# Patient Record
Sex: MALE | Race: WHITE | NOT HISPANIC OR LATINO | Employment: OTHER | URBAN - METROPOLITAN AREA
[De-identification: names, ages, dates, MRNs, and addresses within clinical notes are randomized per-mention and may not be internally consistent; named-entity substitution may affect disease eponyms.]

---

## 2017-01-13 ENCOUNTER — MYC MEDICAL ADVICE (OUTPATIENT)
Dept: ORTHOPEDICS | Facility: CLINIC | Age: 37
End: 2017-01-13

## 2017-01-18 ENCOUNTER — OFFICE VISIT (OUTPATIENT)
Dept: ORTHOPEDICS | Facility: CLINIC | Age: 37
End: 2017-01-18
Payer: COMMERCIAL

## 2017-01-18 VITALS — BODY MASS INDEX: 29.98 KG/M2 | RESPIRATION RATE: 16 BRPM | WEIGHT: 191 LBS | HEIGHT: 67 IN

## 2017-01-18 DIAGNOSIS — M77.12 LATERAL EPICONDYLITIS OF LEFT ELBOW: Primary | ICD-10-CM

## 2017-01-18 PROCEDURE — 20550 NJX 1 TENDON SHEATH/LIGAMENT: CPT | Mod: LT | Performed by: ORTHOPAEDIC SURGERY

## 2017-01-18 RX ORDER — TRIAMCINOLONE ACETONIDE 40 MG/ML
40 INJECTION, SUSPENSION INTRA-ARTICULAR; INTRAMUSCULAR ONCE
Qty: 1 ML | Refills: 0 | OUTPATIENT
Start: 2017-01-18 | End: 2017-01-18

## 2017-01-18 ASSESSMENT — PAIN SCALES - GENERAL: PAINLEVEL: SEVERE PAIN (7)

## 2017-01-18 NOTE — PROGRESS NOTES
Chief Complaint:   Chief Complaint   Patient presents with     RECHECK     left elbow lateral epicondylitis. last injection on 11/4/16. Would like a cortisone injection today prior to going out west on a snowmobiling trip.         HPI: Chintan Niño is a 35 year old male , right -hand dominant, who presents for followup evaluation and management of left elbow lateral epicondylitis, without specific injury. Pain has been present since 11/2014, more than 2 years. Pain is aggravated by rotating forearm. Attributes to work and golfing, playing hockey. His last cortisone injection was on 11/4/2016, 2 months ago, which provided good relief. The pain started to return recently, rated a 7/10. He has also done physical therapy and has been doing the exercises. He is here today for a repeat injection. He is going out west for a snowmobiling trip. He's not been wearing his tennis elbow strap.      He reports having moderate pain/discomfort at the elbow. He no reports numbness or tingling currently.   Pain severity: 7/10   Pain quality: shooting  Frequency of symptoms: are constant  Associated symptoms: radiating pain up the arm, numbness and tingling in the whole hand at times.  Aggravated by: lifting, golf, hockey  Relieved by: cortisone injection     Treatment up to this point: injection, Physical Therapy, tennis elbow band, wrist brace.    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   Patient Active Problem List    Diagnosis Date Noted     Lateral epicondylitis, left elbow 02/17/2016     Priority: Medium     Attention deficit disorder 07/15/2013     Priority: Medium     Problem list name updated by  automated process. Provider to review       Adjustment disorder with mixed anxiety and depressed mood 07/15/2013     Priority: Medium     Contusion of knee - Right 05/08/2012     Priority: Medium     Contusion of elbow - Right 05/08/2012     Priority: Medium     GERD (gastroesophageal reflux disease) 01/20/2011     Priority: Medium     CARDIOVASCULAR SCREENING; LDL GOAL LESS THAN 160 01/20/2011     Priority: Medium         Past surgical history:  has past surgical history that includes Appendectomy open child.     Medications:    Current Outpatient Prescriptions   Medication Sig Dispense Refill     esomeprazole (NEXIUM) 20 MG capsule Take 1 capsule (20 mg) by mouth every morning (before breakfast) Take 30-60 minutes before eating. 90 capsule 1     ranitidine (ZANTAC) 300 MG tablet Take 1 tablet (300 mg) by mouth daily 15 tablet 1     ranitidine (ZANTAC) 300 MG tablet Take 1 tablet (300 mg) by mouth daily 90 tablet 0     sucralfate (CARAFATE) 1 GM tablet Take 1 tablet (1 g) by mouth 4 times daily 40 tablet 1     butalbital-acetaminophen-caffeine (FIORICET, ESGIC) -40 MG per tablet Take 1 tablet by mouth every 4 hours as needed 28 tablet 1        Allergies:   No Known Allergies     Family History: family history includes CANCER in his father; CEREBROVASCULAR DISEASE in his maternal grandmother; DIABETES in his mother. There is no history of Asthma, C.A.D., Hypertension, Breast Cancer, Cancer - colorectal, or Prostate Cancer.     Social History: .  reports that he has quit smoking. He has never used smokeless tobacco. He reports that he does not drink alcohol or use illicit drugs.    Review of Systems:     Denies numbness, tingling, parasthesias.   Denies headaches.   Denies shortness of breath.   Denies any skin problems, abrasions, rashes, irritation.      Physical Exam  GENERAL APPEARANCE: healthy, alert, no distress.   SKIN: no suspicious lesions or rashes  RESPIRATORY: No increased work of  "breathing.  NEURO: Normal strength and tone, mentation intact and speech normal  PSYCH:  mentation appears normal and affect normal/bright. Not anxious.    Resp 16  Ht 1.702 m (5' 7\")  Wt 86.637 kg (191 lb)  BMI 29.91 kg/m2       left UPPER EXTREMITY:    Sensation intact to light touch in median, radial, ulnar and axillary nerve distributions  Palpable 2+ radial pulse, brisk capillary refill to all fingers, wwp  Intact epl fpl fdp edc wrist flexion/extension biceps triceps deltoid    ELBOW:   Skin intact. No open wounds. No skin maceration.  Inspection: no swelling, no ecchymosis, no olecranon bursa swelling, no distal bicep tendon defect  Tender: lateral epicondyle, common extensor tendon and extensor muscles of forearm  Non-tender: medial epicondyle, common flexor tendon, flexor muscle of forearm, supracondylar notch, olecranon bursa, distal bicep tendon and radial head/neck  Range of Motion: flexion: 145 degrees, extension: full, supination:  80 degrees, pronation: 80 degrees  Strength: elbow strength full  Special tests: normal stability, pain with resisted wrist extension, pain with resisted middle finger extension, no pain with resisted wrist flexion  There is no deformity in the area.      X-rays: no new x-rays  3 views left elbow from 12/22/2014:  No obvious fractures or dislocations. Possible small osteophyte lateral aspect radial head. No effusion      MRI left elbow 3/9/3015   1. Findings consistent with lateral epicondylitis.  2. Tiny subchondral cystic change capitellum of uncertain clinical  significance.  3. No other abnormalities.      Assessment: 35 year old male , right -hand dominant, with left elbow pain, lateral epicondylitis.     Plan:   * discussed with patient history and clinical exam consistent with tennis elbow, or lateral epicondylitis, which is tendonitis at the lateral aspect of the elbow.  * treatment is nonoperative, with surgical treatment reserved for recalcitrant symptoms " despite long-term nonperative treatment regimen. Most cases expected to resolve over 1-2 years based on natural history.  * discussed to consider PRP injection in the future.    Treatment includes:  * rest  * activity modification - avoid aggravating activities and reduce strenuous activities for 6-8 weeks. This was emphasized.  * ice, ice massage  * counterforce elbow brace/strap - he already has but doesn't wear  * wrist brace to prevent wrist extension  * modify lifting technique, palm upwards with lifting to relieve stress on extensor tendons of wrist.  * NDAIDS regularly three times daily x2-3 weeks  * cortisone injection discussed, try repeat today.  * Discussed Platelet rich plasma as another potential treatment in future. Costs discussed.  * return to clinic as needed.      PROCEDURE NOTE:   The risks, perceived benefits and potential complications (including but not limited to: bleeding, infection, pain, scar, damage to adjacent structures, atrophy or necrosis of soft tissue, skin blanching, failure to relieve symptoms, worsening of symptoms, allergic reaction) of injection were discussed with the patient. Questions were addressed and answered.The patient elected to proceed. Written informed consent was obtained. The correct procedural site was identified and confirmed. A Left Elbow common extensor tendon sheath injection at the lateral epicondyle was performed using 1mL Kenalog-40 40mg per mL and 1mL  of local anesthetic after sterile prep, to the correct procedural site. Sterile bandaid applied. This was tolerated well by the patient. No apparent complications.  Did also discuss that if diabetic, recommend close monitoring of blood sugars over the next week as cortisone injections can temporarily elevate blood sugars.    This document serves as a record of the services and decisions personally performed and made by Manfred Bajwa MD. It was created on his behalf by Felipa Perez, a trained medical scribe. The  creation of this document is based the provider's statements to the medical scribe.    Yair Perez 5:18 PM 1/18/2017       Manfred Bajwa M.D., M.S.  Dept. of Orthopaedic Surgery  [facility]

## 2017-01-18 NOTE — NURSING NOTE
"Chief Complaint   Patient presents with     RECHECK     left elbow lateral epicondylitis. last injection on 11/4/16. Would like a cortisone injection today prior to going out west on a snowmobiling trip.        Initial Resp 16  Ht 1.702 m (5' 7\")  Wt 86.637 kg (191 lb)  BMI 29.91 kg/m2 Estimated body mass index is 29.91 kg/(m^2) as calculated from the following:    Height as of this encounter: 1.702 m (5' 7\").    Weight as of this encounter: 86.637 kg (191 lb).  BP completed using cuff size: NA (Not Taken)  Balbir Frey PA-C, CAQ (Ortho)  Supervising Physician: Manfred Bajwa M.D., M.S.  Dept. of Orthopaedic Surgery  [facility]      "

## 2017-01-18 NOTE — PROGRESS NOTES
The patient's left elbow lateral epicondylitis was prepped with betadine solution after verification of allergies. Area approximately 10 cm x 10 cm prepped in a sterile fashion. After injection, betadine removed with soap and water and band-aids applied.    1cc Lidocaine 1%  NDC 8302-8662-11, LOT -[id number],  2018  1cc Kenalog 40 NDC 4415-3021-63, LOT [id number],  2018 injected into patient's left elbow lateral epicondylitis by:   Balbir Frey PA-C, DEVENDRA (Ortho)  Supervising Physician: Manfred Bajwa M.D., M.S.  Dept. of Orthopaedic Surgery  [facility]

## 2017-01-18 NOTE — PATIENT INSTRUCTIONS
Please remember to call and schedule a follow up appointment if one was recommended at your earliest convenience.  Orthopedics CLINIC HOURS TELEPHONE NUMBER   Dr. Garett Sullivan  Certified Medical Assistant   Monday & Wednesday   8am - 5pm  Thursday 1pm - 5pm  Friday 8am -11:30am Specialty schedulers:   [phone] to schedule your surgery.  Main Clinic:   [phone] to make an appointment with any provider.    Urgent Care locations:    [facility] Monday-Friday Closed  Saturday-Sunday 9am-5pm      Monday-Friday 12pm - 8pm  Saturday-Sunday 9am-5pm [phone]     If SURGERY has been recommended, please call our Specialty Schedulers at [phone] to schedule your procedure.    If you need a medication refill, please contact your pharmacy. Please allow 3 business days for your refill to be completed.    If an MRI or CT scan has been recommended, please call [facility] Schedulers at [phone] to schedule your appointment.  Use [employer] (secure e-mail communication and access to your chart) to send a message or to make an appointment. Please ask how you can sign up for [employer].  Your care team's suggested websites for health information:   [email or web address] : Up to date and easily searchable information on multiple topics.   [email or web address] : [place] dept of heat, public health issues in [place], N1N1

## 2017-05-03 ENCOUNTER — OFFICE VISIT (OUTPATIENT)
Dept: OPTOMETRY | Facility: CLINIC | Age: 37
End: 2017-05-03
Payer: COMMERCIAL

## 2017-05-03 DIAGNOSIS — Z98.890 STATUS POST LASIK SURGERY: ICD-10-CM

## 2017-05-03 DIAGNOSIS — H52.03 HYPEROPIA, BILATERAL: Primary | ICD-10-CM

## 2017-05-03 PROCEDURE — 92015 DETERMINE REFRACTIVE STATE: CPT | Performed by: OPTOMETRIST

## 2017-05-03 PROCEDURE — 92004 COMPRE OPH EXAM NEW PT 1/>: CPT | Performed by: OPTOMETRIST

## 2017-05-03 ASSESSMENT — REFRACTION_MANIFEST
OS_SPHERE: +1.50
OD_SPHERE: +0.50
OS_AXIS: 110
OS_CYLINDER: +0.25
OD_SPHERE: +1.00
OD_ADD: +1.00
OS_SPHERE: +0.75
METHOD_AUTOREFRACTION: 1
OS_ADD: +1.00

## 2017-05-03 ASSESSMENT — TONOMETRY
IOP_METHOD: APPLANATION
OS_IOP_MMHG: 16
OD_IOP_MMHG: 16

## 2017-05-03 ASSESSMENT — CONF VISUAL FIELD
OD_NORMAL: 1
METHOD: COUNTING FINGERS
OS_NORMAL: 1

## 2017-05-03 ASSESSMENT — KERATOMETRY
OD_K1POWER_DIOPTERS: 48.25
OS_K1POWER_DIOPTERS: 48.25
OD_AXISANGLE2_DEGREES: 167
OD_AXISANGLE_DEGREES: 077
OS_K2POWER_DIOPTERS: 49.25
OS_AXISANGLE_DEGREES: 087
OD_K2POWER_DIOPTERS: 49.00
OS_AXISANGLE2_DEGREES: 177

## 2017-05-03 ASSESSMENT — VISUAL ACUITY
OS_SC: 20/30-2
OD_SC+: -1
METHOD: SNELLEN - LINEAR
OS_SC: 20/25
OD_SC: 20/20
CORRECTION_TYPE: GLASSES
OD_SC: 20/30-1

## 2017-05-03 ASSESSMENT — EXTERNAL EXAM - LEFT EYE: OS_EXAM: NORMAL

## 2017-05-03 ASSESSMENT — EXTERNAL EXAM - RIGHT EYE: OD_EXAM: NORMAL

## 2017-05-03 ASSESSMENT — SLIT LAMP EXAM - LIDS
COMMENTS: BLEPHARITIS
COMMENTS: BLEPHARITIS

## 2017-05-03 ASSESSMENT — CUP TO DISC RATIO
OD_RATIO: 0.25
OS_RATIO: 0.25

## 2017-05-03 NOTE — MR AVS SNAPSHOT
After Visit Summary   5/3/2017    Chintan Niño    MRN: [id number]           Patient Information     Date Of Birth          1980        Visit Information        Provider Department      5/3/2017 2:30 PM Bibiana Ross OD [facility]        Today's Diagnoses     Hyperopia, bilateral    -  1    Status post LASIK surgery          Care Instructions    Wear glasses as needed for near tasks.    Your eyes may be blurry at near and sensitive to light for several hours from the dilating drops.    Return yearly for eye exams.    Thank you!        Follow-ups after your visit        Follow-up notes from your care team     Return in about 1 year (around 5/3/2018) for comprehensive eye exam.      Who to contact     If you have questions or need follow up information about today's clinic visit or your schedule please contact [facility] directly at [phone].  Normal or non-critical lab and imaging results will be communicated to you by [employer], letter or phone within 4 business days after the clinic has received the results. If you do not hear from us within 7 days, please contact the clinic through [employer] or phone. If you have a critical or abnormal lab result, we will notify you by phone as soon as possible.  Submit refill requests through [employer] or call your pharmacy and they will forward the refill request to us. Please allow 3 business days for your refill to be completed.          Additional Information About Your Visit        MyChart Information     [employer] gives you secure access to your electronic health record. If you see a primary care provider, you can also send messages to your care team and make appointments. If you have questions, please call your primary care clinic.  If you do not have a primary care provider, please call [phone] and they will assist you.        Care EveryWhere ID     This is your Care EveryWhere ID. This could be used by other  organizations to access your [place] medical records  [id number]         Blood Pressure from Last 3 Encounters:   08/05/16 126/73   06/15/15 122/72   07/02/14 109/71    Weight from Last 3 Encounters:   01/18/17 86.6 kg (191 lb)   11/04/16 86.5 kg (190 lb 9.6 oz)   08/05/16 88.1 kg (194 lb 3.2 oz)              We Performed the Following     EYE EXAM (SIMPLE-NONBILLABLE)     REFRACTION        Primary Care Provider Office Phone # Fax #    Jairo Hung PA-C [phone]       [facility] [place] [address]  [place] [address]        Thank you!     Thank you for choosing [facility]  for your care. Our goal is always to provide you with excellent care. Hearing back from our patients is one way we can continue to improve our services. Please take a few minutes to complete the written survey that you may receive in the mail after your visit with us. Thank you!             Your Updated Medication List - Protect others around you: Learn how to safely use, store and throw away your medicines at www.disposemymeds.org.          This list is accurate as of: 5/3/17  3:28 PM.  Always use your most recent med list.                   Brand Name Dispense Instructions for use    butalbital-acetaminophen-caffeine -40 MG per tablet    FIORICET/ESGIC    28 tablet    Take 1 tablet by mouth every 4 hours as needed       esomeprazole 20 MG CR capsule    nexIUM    90 capsule    Take 1 capsule (20 mg) by mouth every morning (before breakfast) Take 30-60 minutes before eating.       * ranitidine 300 MG tablet    ZANTAC    15 tablet    Take 1 tablet (300 mg) by mouth daily       * ranitidine 300 MG tablet    ZANTAC    90 tablet    Take 1 tablet (300 mg) by mouth daily       sucralfate 1 GM tablet    CARAFATE    40 tablet    Take 1 tablet (1 g) by mouth 4 times daily       * Notice:  This list has 2 medication(s) that are the same as other medications prescribed for you. Read the directions  carefully, and ask your doctor or other care provider to review them with you.

## 2017-05-03 NOTE — PATIENT INSTRUCTIONS
Wear glasses as needed for near tasks.    Your eyes may be blurry at near and sensitive to light for several hours from the dilating drops.    Return yearly for eye exams.    Thank you!

## 2017-05-03 NOTE — PROGRESS NOTES
Chief Complaint   Patient presents with     COMPREHENSIVE EYE EXAM         Last Eye Exam: 6 years ago  Dilated Previously: Yes    What are you currently using to see?  does not use glasses or contacts       Distance Vision Acuity: Satisfied with vision    Near Vision Acuity: Not satisfied - hard time seeing really small print    Eye Comfort: good  Do you use eye drops? : Yes: Tears - very rarely  Occupation or Hobbies:  - lots of computer time    Dulce Montgomery  [employer]            Medical, surgical and family histories reviewed and updated 5/3/2017.       OBJECTIVE: See Ophthalmology exam    ASSESSMENT:    ICD-10-CM    1. Hyperopia, bilateral H52.03 EYE EXAM (SIMPLE-NONBILLABLE)     REFRACTION   2. Status post LASIK surgery Z98.890 EYE EXAM (SIMPLE-NONBILLABLE)      PLAN:     Patient Instructions   Wear glasses as needed for near tasks.    Your eyes may be blurry at near and sensitive to light for several hours from the dilating drops.    Return yearly for eye exams.    Thank you!

## 2017-05-23 ENCOUNTER — TELEPHONE (OUTPATIENT)
Dept: OPTOMETRY | Facility: CLINIC | Age: 37
End: 2017-05-23

## 2017-05-23 NOTE — TELEPHONE ENCOUNTER
5/23/17    Chintan called looking for paperwork that he said was faxed over a couple weeks ago in regards to him having lasix and the up keep on his eye.  I had Lois check for anything and she did not find any.  Could you please give him a call and let him know if you received anything or not.    Thanks  Abbey Wallace  Clinical

## 2017-05-24 NOTE — TELEPHONE ENCOUNTER
Called Chintan. No forms were found. He said he just faxed the form to us.    Found the form in the fax machine. Cycloplegic refraction was asked for. The cycloplegic is not a part of the routine eye exam and was not done. Also distance visual acuity was 20/20 and 20/25.  Lasik enhancement is usually not done unless acuity is 20/40 or worse. Called Osvaldo back to let him know.

## 2017-06-26 ENCOUNTER — MYC MEDICAL ADVICE (OUTPATIENT)
Dept: ORTHOPEDICS | Facility: CLINIC | Age: 37
End: 2017-06-26

## 2017-06-28 ENCOUNTER — OFFICE VISIT (OUTPATIENT)
Dept: ORTHOPEDICS | Facility: CLINIC | Age: 37
End: 2017-06-28

## 2017-06-28 VITALS — RESPIRATION RATE: 16 BRPM | WEIGHT: 199 LBS | HEIGHT: 67 IN | BODY MASS INDEX: 31.23 KG/M2

## 2017-06-28 DIAGNOSIS — M77.12 LATERAL EPICONDYLITIS OF LEFT ELBOW: Primary | ICD-10-CM

## 2017-06-28 PROCEDURE — 0232T NJX PLATELET PLASMA: CPT | Performed by: ORTHOPAEDIC SURGERY

## 2017-06-28 ASSESSMENT — PAIN SCALES - GENERAL: PAINLEVEL: SEVERE PAIN (7)

## 2017-06-28 NOTE — MR AVS SNAPSHOT
After Visit Summary   6/28/2017    Chintan Niño    MRN: [id number]           Patient Information     Date Of Birth          1980        Visit Information        Provider Department      6/28/2017 9:30 AM Manfred Bajwa MD [facility]        Today's Diagnoses     Lateral epicondylitis of left elbow    -  1      Care Instructions    Please remember to call and schedule a follow up appointment if one was recommended at your earliest convenience.  Orthopedics CLINIC HOURS TELEPHONE NUMBER   Dr. Garett Sullivan  Certified Medical Assistant   Monday & Wednesday   8am - 5pm  Thursday 1pm - 5pm  Friday 8am -11:30am Specialty schedulers:   [phone] to schedule your surgery.  Main Clinic:   [phone] to make an appointment with any provider.    Urgent Care locations:    [facility] Monday-Friday Closed  Saturday-Sunday 9am-5pm      Monday-Friday 12pm - 8pm  Saturday-Sunday 9am-5pm [phone]     If SURGERY has been recommended, please call our Specialty Schedulers at [phone] to schedule your procedure.    If you need a medication refill, please contact your pharmacy. Please allow 3 business days for your refill to be completed.    If an MRI or CT scan has been recommended, please call [facility] Schedulers at [phone] to schedule your appointment.  Use "[employer]t (secure e-mail communication and access to your chart) to send a message or to make an appointment. Please ask how you can sign up for [employer].  Your care team's suggested websites for health information:   [email or web address] : Up to date and easily searchable information on multiple topics.   [email or web address] : [place] dept of heat, public health issues in [place], N1N1              Follow-ups after your visit        Follow-up notes from your care team     Return if symptoms worsen or fail to improve.      Who to contact     If you have questions or  "need follow up information about today's clinic visit or your schedule please contact [facility] directly at [phone].  Normal or non-critical lab and imaging results will be communicated to you by MyChart, letter or phone within 4 business days after the clinic has received the results. If you do not hear from us within 7 days, please contact the clinic through [employer] or phone. If you have a critical or abnormal lab result, we will notify you by phone as soon as possible.  Submit refill requests through [employer]ol[employer]o or call your pharmacy and they will forward the refill request to us. Please allow 3 business days for your refill to be completed.          Additional Information About Your Visit        [employer] Exchange[employer]ula Information     [employer] gives you secure access to your electronic health record. If you see a primary care provider, you can also send messages to your care team and make appointments. If you have questions, please call your primary care clinic.  If you do not have a primary care provider, please call [phone] and they will assist you.        Care EveryWhere ID     This is your Care EveryWhere ID. This could be used by other organizations to access your [place] medical records  [id number]        Your Vitals Were     Respirations Height BMI (Body Mass Index)             16 5' 7\" (1.702 m) 31.17 kg/m2          Blood Pressure from Last 3 Encounters:   08/05/16 126/73   06/15/15 122/72   07/02/14 109/71    Weight from Last 3 Encounters:   06/28/17 199 lb (90.3 kg)   01/18/17 191 lb (86.6 kg)   11/04/16 190 lb 9.6 oz (86.5 kg)              We Performed the Following     C PLATELET RICH PLASMA PRO FEE     C PLATELET RICH PLASMA SERIES 1 KIT     HC INJ(S), PLATELET RICH PLASMA W ARTHREX CENTRIFUGE        Primary Care Provider Office Phone # Fax #    Jairo Hung PA-C [phone]       [facility] [place] [address] [place] [address]        Equal Access to Services     " HEMANT KERN : Hadii aad ku lola Somariaelena, waaxda luqadaha, qaybta kaalmada adesuman, christina yobaniin hayaaoniel salazarned mckeon souleymane . So [facility] [phone].    ATENCIÓN: Si habla español, tiene a ibrahim disposición servicios gratuitos de asistencia lingüística. Llame al [phone].    We comply with applicable federal civil rights laws and [place] laws. We do not discriminate on the basis of race, color, national origin, age, disability sex, sexual orientation or gender identity.            Thank you!     Thank you for choosing [facility]  for your care. Our goal is always to provide you with excellent care. Hearing back from our patients is one way we can continue to improve our services. Please take a few minutes to complete the written survey that you may receive in the mail after your visit with us. Thank you!             Your Updated Medication List - Protect others around you: Learn how to safely use, store and throw away your medicines at www.disposemymeds.org.          This list is accurate as of: 6/28/17  4:00 PM.  Always use your most recent med list.                   Brand Name Dispense Instructions for use Diagnosis    butalbital-acetaminophen-caffeine -40 MG per tablet    FIORICET/ESGIC    28 tablet    Take 1 tablet by mouth every 4 hours as needed    Headache(784.0)       esomeprazole 20 MG CR capsule    nexIUM    90 capsule    Take 1 capsule (20 mg) by mouth every morning (before breakfast) Take 30-60 minutes before eating.    Gastroesophageal reflux disease without esophagitis       * ranitidine 300 MG tablet    ZANTAC    15 tablet    Take 1 tablet (300 mg) by mouth daily    Gastroesophageal reflux disease without esophagitis       * ranitidine 300 MG tablet    ZANTAC    90 tablet    Take 1 tablet (300 mg) by mouth daily    Gastroesophageal reflux disease, esophagitis presence not specified       sucralfate 1 GM tablet    CARAFATE    40 tablet    Take 1 tablet (1 g) by mouth 4  times daily    Gastroesophageal reflux disease, esophagitis presence not specified       * Notice:  This list has 2 medication(s) that are the same as other medications prescribed for you. Read the directions carefully, and ask your doctor or other care provider to review them with you.

## 2017-06-28 NOTE — PATIENT INSTRUCTIONS
Please remember to call and schedule a follow up appointment if one was recommended at your earliest convenience.  Orthopedics CLINIC HOURS TELEPHONE NUMBER   Dr. Garett Sullivan  Certified Medical Assistant   Monday & Wednesday   8am - 5pm  Thursday 1pm - 5pm  Friday 8am -11:30am Specialty schedulers:   [phone] to schedule your surgery.  Main Clinic:   [phone] to make an appointment with any provider.    Urgent Care locations:    [facility] Monday-Friday Closed  Saturday-Sunday 9am-5pm      Monday-Friday 12pm - 8pm  Saturday-Sunday 9am-5pm [phone]     If SURGERY has been recommended, please call our Specialty Schedulers at [phone] to schedule your procedure.    If you need a medication refill, please contact your pharmacy. Please allow 3 business days for your refill to be completed.    If an MRI or CT scan has been recommended, please call [facility] Schedulers at [phone] to schedule your appointment.  Use [employer] (secure e-mail communication and access to your chart) to send a message or to make an appointment. Please ask how you can sign up for [employer].  Your care team's suggested websites for health information:   [email or web address] : Up to date and easily searchable information on multiple topics.   [email or web address] : [place] dept of heat, public health issues in [place], N1N1

## 2017-06-28 NOTE — PROGRESS NOTES
Chief Complaint:   Chief Complaint   Patient presents with     RECHECK     left elbow lateral epicondylitis. Wants PRP injection. Last cortisone injection on 1/18/17. He golfs and plays hockey and wrist has been bothersome for the last 2 months.         HPI: Chintan Niño is a 35 year old male , right -hand dominant, who presents for followup evaluation and management of left elbow lateral epicondylitis, without specific injury. Pain has been present since 11/2014, more than 2 years. Patient returns today for a PRP injection. His last cortisone injection was on 1/18/2017. He is an avid golfer and . Notes pain has been more bothersome in the last couple of months. Pain is 7/10 today.    Pain is aggravated by rotating forearm. Attributes to work and golfing, playing hockey. He has also done physical therapy and has been doing the exercises.    He reports having moderate pain/discomfort at the elbow. He no reports numbness or tingling currently.   Pain severity: 7/10   Pain quality: shooting  Frequency of symptoms: are constant  Associated symptoms: radiating pain up the arm, numbness and tingling in the whole hand at times.  Aggravated by: lifting, golf, hockey  Relieved by: cortisone injection     Treatment up to this point: injection, Physical Therapy, tennis elbow band, wrist brace.    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   Patient Active Problem List    Diagnosis Date Noted     Lateral epicondylitis, left elbow 02/17/2016     Priority: Medium     Attention deficit disorder 07/15/2013     Priority: Medium     Problem list name updated by automated process. Provider to review        Adjustment disorder with mixed anxiety and depressed mood 07/15/2013     Priority: Medium     Contusion of knee - Right 05/08/2012     Priority: Medium     Contusion of elbow - Right 05/08/2012     Priority: Medium     GERD (gastroesophageal reflux disease) 01/20/2011     Priority: Medium     CARDIOVASCULAR SCREENING; LDL GOAL LESS THAN 160 01/20/2011     Priority: Medium         Past surgical history:  has a past surgical history that includes Appendectomy open child and Enhance Laser Refractive Bilateral Existing Pt In Parameters.     Medications:    Current Outpatient Prescriptions   Medication Sig Dispense Refill     esomeprazole (NEXIUM) 20 MG capsule Take 1 capsule (20 mg) by mouth every morning (before breakfast) Take 30-60 minutes before eating. 90 capsule 1     ranitidine (ZANTAC) 300 MG tablet Take 1 tablet (300 mg) by mouth daily 15 tablet 1     ranitidine (ZANTAC) 300 MG tablet Take 1 tablet (300 mg) by mouth daily 90 tablet 0     sucralfate (CARAFATE) 1 GM tablet Take 1 tablet (1 g) by mouth 4 times daily 40 tablet 1     butalbital-acetaminophen-caffeine (FIORICET, ESGIC) -40 MG per tablet Take 1 tablet by mouth every 4 hours as needed 28 tablet 1        Allergies:   No Known Allergies     Family History: family history includes CANCER in his father; CEREBROVASCULAR DISEASE in his maternal grandmother; DIABETES in his mother; Glaucoma in his father; Retinal detachment in his mother. There is no history of Asthma, C.A.D., Hypertension, Breast Cancer, Cancer - colorectal, Prostate Cancer, or Macular Degeneration.     Social History: .  reports that he has quit smoking. He has never used smokeless tobacco. He reports that he does not drink alcohol or use illicit drugs.    Review of Systems:     Denies numbness, tingling, parasthesias.   Denies headaches.   Denies shortness of breath.   Denies any skin problems, abrasions, rashes, irritation.    This document serves as a record of  "the services and decisions personally performed and made by Manfred Bajwa MD. It was created on his behalf by Felipa Perez, a trained medical scribe. The creation of this document is based the provider's statements to the medical scribe.    Sureshibmercedes Preez 9:35 AM 6/28/2017    Physical Exam  GENERAL APPEARANCE: healthy, alert, no distress.   SKIN: no suspicious lesions or rashes  RESPIRATORY: No increased work of breathing.  NEURO: Normal strength and tone, mentation intact and speech normal  PSYCH:  mentation appears normal and affect normal/bright. Not anxious.    Resp 16  Ht 1.702 m (5' 7\")  Wt 90.3 kg (199 lb)  BMI 31.17 kg/m2       left UPPER EXTREMITY:    Sensation intact to light touch in median, radial, ulnar and axillary nerve distributions  Palpable 2+ radial pulse, brisk capillary refill to all fingers, wwp  Intact epl fpl fdp edc wrist flexion/extension biceps triceps deltoid    ELBOW:   Skin intact. No open wounds. No skin maceration.  Inspection: no swelling, no ecchymosis, no olecranon bursa swelling, no distal bicep tendon defect  Tender: lateral epicondyle, common extensor tendon and extensor muscles of forearm  Non-tender: medial epicondyle, common flexor tendon, flexor muscle of forearm, supracondylar notch, olecranon bursa, distal bicep tendon and radial head/neck  Range of Motion: flexion: 145 degrees, extension: full, supination:  80 degrees, pronation: 80 degrees  Strength: elbow strength full  Special tests: normal stability, pain with resisted wrist extension, pain with resisted middle finger extension, no pain with resisted wrist flexion  There is no deformity in the area.      X-rays: no new x-rays  3 views left elbow from 12/22/2014:  No obvious fractures or dislocations. Possible small osteophyte lateral aspect radial head. No effusion      MRI left elbow 3/9/3015   1. Findings consistent with lateral epicondylitis.  2. Tiny subchondral cystic change capitellum of uncertain " clinical  significance.  3. No other abnormalities.      Assessment: 35 year old male , right -hand dominant, with left elbow pain, lateral epicondylitis.     Plan:   * discussed with patient history and clinical exam consistent with tennis elbow, or lateral epicondylitis, which is tendonitis at the lateral aspect of the elbow.  * treatment is nonoperative, with surgical treatment reserved for recalcitrant symptoms despite long-term nonperative treatment regimen. Most cases expected to resolve over 1-2 years based on natural history.    Treatment includes:  * rest  * activity modification - avoid aggravating activities and reduce strenuous activities for 6-8 weeks  * ice, ice massage  * Physical therapy, modalities as indicated including ultrasound, massage, iontophoresis  * counterforce elbow brace/strap, available OTC  * wrist brace to prevent wrist extension  * modify lifting technique, palm upwards with lifting to relieve stress on extensor tendons of wrist.  * NDAIDS regularly three times daily x2-3 weeks  * PRP injection discussed, placed at point of maximal tenderness. See procedure note below.  * return to clinic as needed.      PROCEDURE NOTE:  The risks, perceived benefits and potential complications (including but not limited to: bleeding, infection, pain, scar, damage to adjacent structures, atrophy or necrosis of soft tissue, skin blanching, failure to relieve symptoms, worsening of symptoms, allergic reaction) of injection were discussed with the patient. Questions were addressed and answered.The patient elected to proceed. Written informed consent was obtained. The correct procedural site was identified and confirmed. A Left Elbow lateral epicondyle injection was performed using 5 mL platelet rich plasma using the Arthrex ACP Kit (autologous conditioned plasma, double syringe, per Arthrex centrifuge and administration instructions) after sterile prep, to the correct procedural site. Sterile bandaid  applied. This was tolerated well by the patient. No apparent complications.       This document serves as a record of the services and decisions personally performed and made by Manfred Bajwa MD. It was created on his behalf by Felipa Perez, a trained medical scribe. The creation of this document is based the provider's statements to the medical scribe.    Sureshibmercedes Perez 5:18 PM 1/18/2017       Manfred Bajwa M.D., M.S.  Dept. of Orthopaedic Surgery  [facility]

## 2017-06-28 NOTE — LETTER
6/28/2017        RE: Chintan Niño  [address] [place] [address] [place] [address]        Chief Complaint:   Chief Complaint   Patient presents with     RECHECK     left elbow lateral epicondylitis. Wants PRP injection. Last cortisone injection on 1/18/17. He golfs and plays hockey and wrist has been bothersome for the last 2 months.         HPI: Chintan Niño is a 35 year old male , right -hand dominant, who presents for followup evaluation and management of left elbow lateral epicondylitis, without specific injury. Pain has been present since 11/2014, more than 2 years. Patient returns today for a PRP injection. His last cortisone injection was on 1/18/2017. He is an avid golfer and . Notes pain has been more bothersome in the last couple of months. Pain is 7/10 today.    Pain is aggravated by rotating forearm. Attributes to work and golfing, playing hockey. He has also done physical therapy and has been doing the exercises.    He reports having moderate pain/discomfort at the elbow. He no reports numbness or tingling currently.   Pain severity: 7/10   Pain quality: shooting  Frequency of symptoms: are constant  Associated symptoms: radiating pain up the arm, numbness and tingling in the whole hand at times.  Aggravated by: lifting, golf, hockey  Relieved by: cortisone injection     Treatment up to this point: injection, Physical Therapy, tennis elbow band, wrist brace.    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   Patient Active Problem List    Diagnosis Date Noted     Lateral epicondylitis, left elbow 02/17/2016     Priority: Medium     Attention deficit disorder 07/15/2013      Priority: Medium     Problem list name updated by automated process. Provider to review       Adjustment disorder with mixed anxiety and depressed mood 07/15/2013     Priority: Medium     Contusion of knee - Right 05/08/2012     Priority: Medium     Contusion of elbow - Right 05/08/2012     Priority: Medium     GERD (gastroesophageal reflux disease) 01/20/2011     Priority: Medium     CARDIOVASCULAR SCREENING; LDL GOAL LESS THAN 160 01/20/2011     Priority: Medium         Past surgical history:  has a past surgical history that includes Appendectomy open child and Enhance Laser Refractive Bilateral Existing Pt In Parameters.     Medications:    Current Outpatient Prescriptions   Medication Sig Dispense Refill     esomeprazole (NEXIUM) 20 MG capsule Take 1 capsule (20 mg) by mouth every morning (before breakfast) Take 30-60 minutes before eating. 90 capsule 1     ranitidine (ZANTAC) 300 MG tablet Take 1 tablet (300 mg) by mouth daily 15 tablet 1     ranitidine (ZANTAC) 300 MG tablet Take 1 tablet (300 mg) by mouth daily 90 tablet 0     sucralfate (CARAFATE) 1 GM tablet Take 1 tablet (1 g) by mouth 4 times daily 40 tablet 1     butalbital-acetaminophen-caffeine (FIORICET, ESGIC) -40 MG per tablet Take 1 tablet by mouth every 4 hours as needed 28 tablet 1        Allergies:   No Known Allergies     Family History: family history includes CANCER in his father; CEREBROVASCULAR DISEASE in his maternal grandmother; DIABETES in his mother; Glaucoma in his father; Retinal detachment in his mother. There is no history of Asthma, C.A.D., Hypertension, Breast Cancer, Cancer - colorectal, Prostate Cancer, or Macular Degeneration.     Social History: .  reports that he has quit smoking. He has never used smokeless tobacco. He reports that he does not drink alcohol or use illicit drugs.    Review of Systems:     Denies numbness, tingling, parasthesias.   Denies headaches.   Denies shortness of breath.   Denies  "any skin problems, abrasions, rashes, irritation.    This document serves as a record of the services and decisions personally performed and made by Manfred Bajwa MD. It was created on his behalf by Felipa Perez, a trained medical scribe. The creation of this document is based the provider's statements to the medical scribe.    Scribmercedes Perez 9:35 AM 6/28/2017    Physical Exam  GENERAL APPEARANCE: healthy, alert, no distress.   SKIN: no suspicious lesions or rashes  RESPIRATORY: No increased work of breathing.  NEURO: Normal strength and tone, mentation intact and speech normal  PSYCH:  mentation appears normal and affect normal/bright. Not anxious.    Resp 16  Ht 1.702 m (5' 7\")  Wt 90.3 kg (199 lb)  BMI 31.17 kg/m2       left UPPER EXTREMITY:    Sensation intact to light touch in median, radial, ulnar and axillary nerve distributions  Palpable 2+ radial pulse, brisk capillary refill to all fingers, wwp  Intact epl fpl fdp edc wrist flexion/extension biceps triceps deltoid    ELBOW:   Skin intact. No open wounds. No skin maceration.  Inspection: no swelling, no ecchymosis, no olecranon bursa swelling, no distal bicep tendon defect  Tender: lateral epicondyle, common extensor tendon and extensor muscles of forearm  Non-tender: medial epicondyle, common flexor tendon, flexor muscle of forearm, supracondylar notch, olecranon bursa, distal bicep tendon and radial head/neck  Range of Motion: flexion: 145 degrees, extension: full, supination:  80 degrees, pronation: 80 degrees  Strength: elbow strength full  Special tests: normal stability, pain with resisted wrist extension, pain with resisted middle finger extension, no pain with resisted wrist flexion  There is no deformity in the area.      X-rays: no new x-rays  3 views left elbow from 12/22/2014:  No obvious fractures or dislocations. Possible small osteophyte lateral aspect radial head. No effusion      MRI left elbow 3/9/3015   1. Findings consistent with " lateral epicondylitis.  2. Tiny subchondral cystic change capitellum of uncertain clinical  significance.  3. No other abnormalities.      Assessment: 35 year old male , right -hand dominant, with left elbow pain, lateral epicondylitis.     Plan:   * discussed with patient history and clinical exam consistent with tennis elbow, or lateral epicondylitis, which is tendonitis at the lateral aspect of the elbow.  * treatment is nonoperative, with surgical treatment reserved for recalcitrant symptoms despite long-term nonperative treatment regimen. Most cases expected to resolve over 1-2 years based on natural history.    Treatment includes:  * rest  * activity modification - avoid aggravating activities and reduce strenuous activities for 6-8 weeks  * ice, ice massage  * Physical therapy, modalities as indicated including ultrasound, massage, iontophoresis  * counterforce elbow brace/strap, available OTC  * wrist brace to prevent wrist extension  * modify lifting technique, palm upwards with lifting to relieve stress on extensor tendons of wrist.  * NDAIDS regularly three times daily x2-3 weeks  * PRP injection discussed, placed at point of maximal tenderness. See procedure note below.  * return to clinic as needed.      PROCEDURE NOTE:  The risks, perceived benefits and potential complications (including but not limited to: bleeding, infection, pain, scar, damage to adjacent structures, atrophy or necrosis of soft tissue, skin blanching, failure to relieve symptoms, worsening of symptoms, allergic reaction) of injection were discussed with the patient. Questions were addressed and answered.The patient elected to proceed. Written informed consent was obtained. The correct procedural site was identified and confirmed. A Left Elbow lateral epicondyle injection was performed using 5 mL platelet rich plasma using the Arthrex ACP Kit (autologous conditioned plasma, double syringe, per Arthrex centrifuge and administration  instructions) after sterile prep, to the correct procedural site. Sterile bandaid applied. This was tolerated well by the patient. No apparent complications.       This document serves as a record of the services and decisions personally performed and made by Manfred Bajwa MD. It was created on his behalf by Felipa Perez, a trained medical scribe. The creation of this document is based the provider's statements to the medical scribe.    Scribe Felipa Perez 5:18 PM 1/18/2017       Manfred Bajwa M.D., M.S.  Dept. of Orthopaedic Surgery  [facility]                Sincerely,        Manfred Bajwa MD

## 2017-06-28 NOTE — Clinical Note
6/28/2017         RE: Chintan Niño  [address] [facility] [address] [place] [address]        Dear Colleague,    Thank you for referring your patient, Chintan Niño, to the [facility]. Please see a copy of my visit note below.    Chief Complaint:   Chief Complaint   Patient presents with     RECHECK     left elbow lateral epicondylitis. Wants PRP injection. Last cortisone injection on 1/18/17. He golfs and plays hockey and wrist has been bothersome for the last 2 months.         HPI: Chintan Niño is a 35 year old male , right -hand dominant, who presents for followup evaluation and management of left elbow lateral epicondylitis, without specific injury. Pain has been present since 11/2014, more than 2 years. Patient returns today for a PRP injection. His last cortisone injection was on 1/18/2017. He is an avid golfer and . Notes pain has been more bothersome in the last couple of months. Pain is 7/10 today.    Recall: Pain is aggravated by rotating forearm. Attributes to work and golfing, playing hockey. His last cortisone injection was on 11/4/2016, 2 months ago, which provided good relief. The pain started to return recently, rated a 7/10. He has also done physical therapy and has been doing the exercises. He is here today for a repeat injection. [place]. He's not been wearing his tennis elbow strap.      He reports having moderate pain/discomfort at the elbow. He no reports numbness or tingling currently.   Pain severity: 7/10   Pain quality: shooting  Frequency of symptoms: are constant  Associated symptoms: radiating pain up the arm, numbness and tingling in the whole hand at times.  Aggravated by: lifting, golf, hockey  Relieved by: cortisone injection     Treatment up to this point: injection, Physical Therapy, tennis elbow band, wrist brace.    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   Patient Active Problem List    Diagnosis Date Noted     Lateral epicondylitis, left elbow 02/17/2016     Priority: Medium     Attention deficit disorder 07/15/2013     Priority: Medium     Problem list name updated by automated process. Provider to review       Adjustment disorder with mixed anxiety and depressed mood 07/15/2013     Priority: Medium     Contusion of knee - Right 05/08/2012     Priority: Medium     Contusion of elbow - Right 05/08/2012     Priority: Medium     GERD (gastroesophageal reflux disease) 01/20/2011     Priority: Medium     CARDIOVASCULAR SCREENING; LDL GOAL LESS THAN 160 01/20/2011     Priority: Medium         Past surgical history:  has a past surgical history that includes Appendectomy open child and Enhance Laser Refractive Bilateral Existing Pt In Parameters.     Medications:    Current Outpatient Prescriptions   Medication Sig Dispense Refill     esomeprazole (NEXIUM) 20 MG capsule Take 1 capsule (20 mg) by mouth every morning (before breakfast) Take 30-60 minutes before eating. 90 capsule 1     ranitidine (ZANTAC) 300 MG tablet Take 1 tablet (300 mg) by mouth daily 15 tablet 1     ranitidine (ZANTAC) 300 MG tablet Take 1 tablet (300 mg) by mouth daily 90 tablet 0     sucralfate (CARAFATE) 1 GM tablet Take 1 tablet (1 g) by mouth 4 times daily 40 tablet 1     butalbital-acetaminophen-caffeine (FIORICET, ESGIC) -40 MG per tablet Take 1 tablet by mouth every 4 hours as needed 28 tablet 1        Allergies:   No Known Allergies     Family History: family history includes CANCER in his father; CEREBROVASCULAR DISEASE in his maternal grandmother; DIABETES in his mother; Glaucoma in his father; Retinal detachment in his mother. There  "is no history of Asthma, C.A.D., Hypertension, Breast Cancer, Cancer - colorectal, Prostate Cancer, or Macular Degeneration.     Social History: .  reports that he has quit smoking. He has never used smokeless tobacco. He reports that he does not drink alcohol or use illicit drugs.    Review of Systems:     Denies numbness, tingling, parasthesias.   Denies headaches.   Denies shortness of breath.   Denies any skin problems, abrasions, rashes, irritation.    This document serves as a record of the services and decisions personally performed and made by Manfred Bajwa MD. It was created on his behalf by Felipa Perez, a trained medical scribe. The creation of this document is based the provider's statements to the medical scribe.    Scribe Felipa Perez 9:35 AM 6/28/2017    Physical Exam  GENERAL APPEARANCE: healthy, alert, no distress.   SKIN: no suspicious lesions or rashes  RESPIRATORY: No increased work of breathing.  NEURO: Normal strength and tone, mentation intact and speech normal  PSYCH:  mentation appears normal and affect normal/bright. Not anxious.    Resp 16  Ht 1.702 m (5' 7\")  Wt 90.3 kg (199 lb)  BMI 31.17 kg/m2       left UPPER EXTREMITY:    Sensation intact to light touch in median, radial, ulnar and axillary nerve distributions  Palpable 2+ radial pulse, brisk capillary refill to all fingers, wwp  Intact epl fpl fdp edc wrist flexion/extension biceps triceps deltoid    ELBOW:   Skin intact. No open wounds. No skin maceration.  Inspection: no swelling, no ecchymosis, no olecranon bursa swelling, no distal bicep tendon defect  Tender: lateral epicondyle, common extensor tendon and extensor muscles of forearm  Non-tender: medial epicondyle, common flexor tendon, flexor muscle of forearm, supracondylar notch, olecranon bursa, distal bicep tendon and radial head/neck  Range of Motion: flexion: 145 degrees, extension: full, supination:  80 degrees, pronation: 80 degrees  Strength: elbow " strength full  Special tests: normal stability, pain with resisted wrist extension, pain with resisted middle finger extension, no pain with resisted wrist flexion  There is no deformity in the area.      X-rays: no new x-rays  3 views left elbow from 12/22/2014:  No obvious fractures or dislocations. Possible small osteophyte lateral aspect radial head. No effusion      MRI left elbow 3/9/3015   1. Findings consistent with lateral epicondylitis.  2. Tiny subchondral cystic change capitellum of uncertain clinical  significance.  3. No other abnormalities.      Assessment: 35 year old male , right -hand dominant, with left elbow pain, lateral epicondylitis.     Plan:   * discussed with patient history and clinical exam consistent with tennis elbow, or lateral epicondylitis, which is tendonitis at the lateral aspect of the elbow.  * treatment is nonoperative, with surgical treatment reserved for recalcitrant symptoms despite long-term nonperative treatment regimen. Most cases expected to resolve over 1-2 years based on natural history.    Treatment includes:  * rest  * activity modification - avoid aggravating activities and reduce strenuous activities for 6-8 weeks  * ice, ice massage  * Physical therapy, modalities as indicated including ultrasound, massage, iontophoresis  * counterforce elbow brace/strap, available OTC  * wrist brace to prevent wrist extension  * modify lifting technique, palm upwards with lifting to relieve stress on extensor tendons of wrist.  * NDAIDS regularly three times daily x2-3 weeks  * PRP injection discussed, placed at point of maximal tenderness.  * return to clinic as needed.    ** risks of surgery include, but not limited to: bleeding, infection, pain, scar, damage to adjacent structures (nerves, vessels, tendons, ligaments, bone), temporary versus permanent nerve injury, failure to relieve symptoms, worsening of symptoms, recurrence of symptoms, elbow stiffness and decreased range of  motion, instability, arthrosis, need for further surgery, risks of anesthesia, blood clots, death.    PROCEDURE NOTE:  The risks, perceived benefits and potential complications (including but not limited to: bleeding, infection, pain, scar, damage to adjacent structures, atrophy or necrosis of soft tissue, skin blanching, failure to relieve symptoms, worsening of symptoms, allergic reaction) of injection were discussed with the patient. Questions were addressed and answered.The patient elected to proceed. Written informed consent was obtained. The correct procedural site was identified and confirmed. A Left Elbow lateral epicondyle injection was performed using 6 mL *** and {ortho ml/cc:534192}  of local anesthetic after sterile prep, to the correct procedural site. Sterile bandaid applied. This was tolerated well by the patient. No apparent complications. Did also discuss that if diabetic, recommend close monitoring of blood sugars over the next week as cortisone injections can temporarily elevate blood sugars.        This document serves as a record of the services and decisions personally performed and made by Manfred Bajwa MD. It was created on his behalf by Felipa Perez, a trained medical scribe. The creation of this document is based the provider's statements to the medical scribe.    Scribe Felipa Perez 5:18 PM 1/18/2017       Manfred Bajwa M.D., M.S.  Dept. of Orthopaedic Surgery  [facility]              Again, thank you for allowing me to participate in the care of your patient.        Sincerely,        Manfred Bajwa MD

## 2017-06-28 NOTE — NURSING NOTE
"Chief Complaint   Patient presents with     RECHECK     left elbow lateral epicondylitis. Wants PRP injection. Last cortisone injection on 1/18/17. He golfs and plays hockey and wrist has been bothersome for the last 2 months.        Initial Resp 16  Ht 1.702 m (5' 7\")  Wt 90.3 kg (199 lb)  BMI 31.17 kg/m2 Estimated body mass index is 31.17 kg/(m^2) as calculated from the following:    Height as of this encounter: 1.702 m (5' 7\").    Weight as of this encounter: 90.3 kg (199 lb).  Medication Reconciliation: complete   Balbir Frey PA-C, CAQ (Ortho)  Supervising Physician: Manfred Bajwa M.D., M.S.  Dept. of Orthopaedic Surgery  [facility]      "

## 2018-02-04 ENCOUNTER — TRANSFERRED RECORDS (OUTPATIENT)
Dept: HEALTH INFORMATION MANAGEMENT | Facility: CLINIC | Age: 38
End: 2018-02-04

## 2018-02-08 ENCOUNTER — OFFICE VISIT (OUTPATIENT)
Dept: FAMILY MEDICINE | Facility: CLINIC | Age: 38
End: 2018-02-08
Payer: COMMERCIAL

## 2018-02-08 VITALS
DIASTOLIC BLOOD PRESSURE: 78 MMHG | WEIGHT: 190 LBS | BODY MASS INDEX: 29.82 KG/M2 | HEIGHT: 67 IN | HEART RATE: 81 BPM | SYSTOLIC BLOOD PRESSURE: 119 MMHG | TEMPERATURE: 97.8 F | OXYGEN SATURATION: 96 % | RESPIRATION RATE: 18 BRPM

## 2018-02-08 DIAGNOSIS — K13.0 MUCOCELE OF LIP: Primary | ICD-10-CM

## 2018-02-08 DIAGNOSIS — R07.89 ATYPICAL CHEST PAIN: ICD-10-CM

## 2018-02-08 PROCEDURE — 99214 OFFICE O/P EST MOD 30 MIN: CPT | Performed by: PHYSICIAN ASSISTANT

## 2018-02-08 NOTE — PROGRESS NOTES
SUBJECTIVE:   Chintan Niño is a 37 year old male who presents to clinic today for the following health issues:      CHEST PAIN follow up has improved since [facility]. EKG was normal. Has had this previously was told acid reflux related.     Onset: last week    Description:   Location:  left side  Character: sharp  Radiation: on left side  Duration: 24 hours     Intensity: mild    Progression of Symptoms:  Symptoms are gone    Accompanying Signs & Symptoms:  Shortness of breath: no  Sweating: YES  Nausea/vomiting: no  Lightheadedness: no  Palpitations: yes  Fever/Chills: no  Cough: no  Heartburn: YES- chronic    History:   Family history of heart disease no  Tobacco use: no    Precipitating factors:   Worse with exertion: no  Worse with deep breaths :  no  Related to food: unsure    Alleviating factors:         Therapies Tried and outcome: nexium with some relief    Left sided chest pain last week. No sob or nausea. No sweats. Reflux meds did not help at all. Some sharper pains, then more of a pressure. Some symptoms radiated into neck and even left arm.  No fhx of heart dx.   Exercises routinely and strenuously, but denies chest pain. No cervicalgia.   [facility] work up. Cxr, ekg and troponin, d dimer negative   Chest pain resolved spontaneously. Worse with sitting up.   Problem list and histories reviewed & adjusted, as indicated.  Additional history: as documented  Lower lip lesion: suggestive of a mucocele.      Reviewed and updated as needed this visit by clinical staff  Tobacco  Allergies  Meds       Reviewed and updated as needed this visit by Provider         All other systems negative except as outline above.  OBJECTIVE:  Eye exam - right eye normal lid, conjunctiva, cornea, pupil and fundus, left eye normal lid, conjunctiva, cornea, pupil and fundus.  CHEST:chest clear to IPPA, no tachypnea, retractions or cyanosis and S1, S2 normal, no murmur, no gallop, rate regular.  Thyroid  not palpable, not enlarged, no nodules detected.  The abdomen is soft without tenderness, guarding, mass, rebound or organomegaly. Bowel sounds are normal. No CVA tenderness or inguinal adenopathy noted.      Chintan was seen today for chest pain.    Diagnoses and all orders for this visit:    Mucocele of lip  -     OTOLARYNGOLOGY REFERRAL    Atypical chest pain      Advised supportive and symptomatic treatment.  Follow up with Provider - if condition persists or worsens.

## 2018-02-08 NOTE — MR AVS SNAPSHOT
After Visit Summary   2/8/2018    Chintan Niño    MRN: [id number]           Patient Information     Date Of Birth          1980        Visit Information        Provider Department      2/8/2018 10:40 AM Jairo Hung PA-C [facility]        Today's Diagnoses     Mucocele of lip    -  1    Atypical chest pain           Follow-ups after your visit        Additional Services     OTOLARYNGOLOGY REFERRAL       Your provider has referred you to: [facility]: [facility] [phone]  [email or web address]    Please be aware that coverage of these services is subject to the terms and limitations of your health insurance plan.  Call member services at your health plan with any benefit or coverage questions.      Please bring the following with you to your appointment:    (1) Any X-Rays, CTs or MRIs which have been performed.  Contact the facility where they were done to arrange for  prior to your scheduled appointment.   (2) List of current medications  (3) This referral request   (4) Any documents/labs given to you for this referral                  Who to contact     Normal or non-critical lab and imaging results will be communicated to you by [facility], letter or phone within 4 business days after the clinic has received the results. If you do not hear from us within 7 days, please contact the clinic through [facility] or phone. If you have a critical or abnormal lab result, we will notify you by phone as soon as possible.  Submit refill requests through [facility] or call your pharmacy and they will forward the refill request to us. Please allow 3 business days for your refill to be completed.          If you need to speak with a  for additional information , please call: [phone]             Additional Information About Your Visit        [facility] Information     [facility] gives you secure access to your electronic health record. If  "you see a primary care provider, you can also send messages to your care team and make appointments. If you have questions, please call your primary care clinic.  If you do not have a primary care provider, please call [phone] and they will assist you.        Care EveryWhere ID     This is your Care EveryWhere ID. This could be used by other organizations to access your [facility] medical records  [id number]        Your Vitals Were     Pulse Temperature Respirations Height Pulse Oximetry BMI (Body Mass Index)    81 97.8  F (36.6  C) (Tympanic) 18 5' 7\" (1.702 m) 96% 29.76 kg/m2       Blood Pressure from Last 3 Encounters:   02/08/18 119/78   08/05/16 126/73   06/15/15 122/72    Weight from Last 3 Encounters:   02/08/18 190 lb (86.2 kg)   06/28/17 199 lb (90.3 kg)   01/18/17 191 lb (86.6 kg)              We Performed the Following     OTOLARYNGOLOGY REFERRAL          Today's Medication Changes          These changes are accurate as of 2/8/18 11:48 AM.  If you have any questions, ask your nurse or doctor.               Stop taking these medicines if you haven't already. Please contact your care team if you have questions.     ranitidine 300 MG tablet   Commonly known as:  ZANTAC   Stopped by:  Jairo Hung PA-C           sucralfate 1 GM tablet   Commonly known as:  CARAFATE   Stopped by:  Jairo Hung PA-C                    Primary Care Provider Office Phone # Fax #    Jairo Hung PA-C [phone]       [address]  [place] [address]        Equal Access to Services     [facility]: Hadii wyatt fox hadasho Somariaelena, waaxda luqadaha, qaybta kaalmada elian, christina comer . So [facility] [phone].    ATENCIÓN: Si habla español, tiene a ibrahim disposición servicios gratuitos de asistencia lingüística. Llame al [phone].    We comply with applicable federal civil rights laws and [place] laws. We do not discriminate on the basis of race, color, " national origin, age, disability, sex, sexual orientation, or gender identity.            Thank you!     Thank you for choosing [facility]  for your care. Our goal is always to provide you with excellent care. Hearing back from our patients is one way we can continue to improve our services. Please take a few minutes to complete the written survey that you may receive in the mail after your visit with us. Thank you!             Your Updated Medication List - Protect others around you: Learn how to safely use, store and throw away your medicines at www.disposemymeds.org.          This list is accurate as of 2/8/18 11:48 AM.  Always use your most recent med list.                   Brand Name Dispense Instructions for use Diagnosis    butalbital-acetaminophen-caffeine -40 MG per tablet    FIORICET/ESGIC    28 tablet    Take 1 tablet by mouth every 4 hours as needed    Headache(784.0)       esomeprazole 20 MG CR capsule    nexIUM    90 capsule    Take 1 capsule (20 mg) by mouth every morning (before breakfast) Take 30-60 minutes before eating.    Gastroesophageal reflux disease without esophagitis

## 2018-02-27 ENCOUNTER — OFFICE VISIT (OUTPATIENT)
Dept: OTOLARYNGOLOGY | Facility: CLINIC | Age: 38
End: 2018-02-27
Attending: PHYSICIAN ASSISTANT
Payer: COMMERCIAL

## 2018-02-27 VITALS
HEART RATE: 100 BPM | BODY MASS INDEX: 30.51 KG/M2 | SYSTOLIC BLOOD PRESSURE: 115 MMHG | HEIGHT: 67 IN | RESPIRATION RATE: 16 BRPM | WEIGHT: 194.4 LBS | DIASTOLIC BLOOD PRESSURE: 76 MMHG

## 2018-02-27 DIAGNOSIS — K13.70 ORAL LESION: Primary | ICD-10-CM

## 2018-02-27 PROCEDURE — 99203 OFFICE O/P NEW LOW 30 MIN: CPT | Performed by: OTOLARYNGOLOGY

## 2018-02-27 NOTE — LETTER
2/27/2018         RE: Chintan Niño  [address] [place] [address] [place] [address]        Dear Colleague,    Thank you for referring your patient, Chintan Niño, to the [facility]. Please see a copy of my visit note below.    Chief Complaint - oral lesion    History of Present Illness - Chintan Niño is a 37 year old male presents with a lesion on the right lower lip. The patient has noticed for approximately 2 years. It hasn't been changing in size. It isn't painful. No citrus or spicy intolerance. No bleeding. nonsmoker, former chewing tobacco. No lumps or swollen glands in the neck. No blood thinners. Wants it removed.     Past Medical History -   Patient Active Problem List   Diagnosis     GERD (gastroesophageal reflux disease)     CARDIOVASCULAR SCREENING; LDL GOAL LESS THAN 160     Contusion of knee - Right     Contusion of elbow - Right     Attention deficit disorder     Adjustment disorder with mixed anxiety and depressed mood     Lateral epicondylitis, left elbow       Current Medications -   Current Outpatient Prescriptions:      esomeprazole (NEXIUM) 20 MG capsule, Take 1 capsule (20 mg) by mouth every morning (before breakfast) Take 30-60 minutes before eating., Disp: 90 capsule, Rfl: 1     butalbital-acetaminophen-caffeine (FIORICET, ESGIC) -40 MG per tablet, Take 1 tablet by mouth every 4 hours as needed, Disp: 28 tablet, Rfl: 1    Allergies - No Known Allergies    Social History -   Social History     Social History     Marital status:      Spouse name: N/A     Number of children: N/A     Years of education: N/A     Social History Main Topics     Smoking status: Former Smoker     Smokeless tobacco: Former User     Alcohol use No     Drug use: No     Sexual activity: Yes     Partners: Female     Other Topics Concern     None     Social History Narrative       Family History -   Family History   Problem Relation Age of Onset     DIABETES Mother      Retinal detachment Mother      CANCER  "Father      Bone cancer     Glaucoma Father      CEREBROVASCULAR DISEASE Maternal Grandmother      Asthma No family hx of      C.A.D. No family hx of      Hypertension No family hx of      Breast Cancer No family hx of      Cancer - colorectal No family hx of      Prostate Cancer No family hx of      Macular Degeneration No family hx of        Review of Systems - As per HPI and PMHx, otherwise 7 system review of the head and neck negative.    Physical Exam  /76  Pulse 100  Resp 16  Ht 1.702 m (5' 7\")  Wt 88.2 kg (194 lb 6.4 oz)  BMI 30.45 kg/m2  General - The patient is in no distress. Alert and oriented x3, answers questions and cooperates with examination appropriately.   Voice and Breathing - The patient was breathing comfortably without the use of accessory muscles. There was no wheezing, stridor, or stertor.  The patients voice was clear and strong.  Eyes - Extraocular movements intact. Sclera were not icteric or injected, conjunctiva were pink and moist.  Neurologic - Cranial nerves II-XII are grossly intact. Specifically, the facial nerve is intact, House-Brackmann grade 1 of 6.   Mouth - Examination of the oral cavity showed a 2-3 mm lesion located on the right lower lip. On close examination it is pulsatile and tracks under the mucosa. It is a vessel. It maybe a small aneurysm or tortuous part of the vessel. Right buccal mucosa with some trauma from biting. No leukoplakia. The tongue was mobile and protrudes midline.   Oropharynx - The walls of the oropharynx were smooth, symmetric, and had no lesions or ulcerations. The uvula was midline and the palate raised symmetrically.   Neck -  Palpation of the occipital, submental, submandibular, internal jugular chain, and supraclavicular nodes did not demonstrate any abnormal lymph nodes or masses. The parotid glands were without masses. Palpation of the thyroid was soft and smooth, with no nodules or goiter appreciated.  The trachea was " midline.      A/P - Chintan Niño is a 37 year old male with a lesion on the right lower lip. It is small, but on close examination it is pulsatile and tracks as a vessel. It maybe a small aneurysm or pseudoaneusym from trauma. It doesn't cause symptoms. I would recommend not removing it. He agrees.       Johnie Matson MD  Otolaryngology  [facility]      Again, thank you for allowing me to participate in the care of your patient.        Sincerely,        Johnie Matson MD

## 2018-02-27 NOTE — MR AVS SNAPSHOT
After Visit Summary   2/27/2018    Chintan Niño    MRN: [id number]           Patient Information     Date Of Birth          1980        Visit Information        Provider Department      2/27/2018 1:45 PM Johnie Matson MD [facility]        Today's Diagnoses     Oral lesion    -  1      Care Instructions    General Scheduling Information  To schedule your CT/MRI scan, please contact [place] [facility] at [phone]   [address] [place] [address]    To schedule your Surgery, please contact our Specialty Schedulers at [phone]    ENT Clinic Locations Clinic Hours Telephone Number     [facility]  [address]  [place] [address]   Tuesday:       8:00am -- 4:00pm    Wednesday:  8:00am - 4:00pm   To schedule an appointment with   Dr. Matson,   please contact our   Specialty Scheduling Department at:     [phone]       [facility]  [address] [place] [address]   Friday:          8:00am - 4:00pm         Urgent Care Locations Clinic Hours Telephone Numbers     [facility] [place]  [address] [place] [address]     Monday-Friday:     11:00pm - 9:00pm    Saturday-Sunday:  9:00am - 5:00pm   [phone]     [facility]  [address] [place] [address]     Monday-Friday:      5:00pm - 9:00pm     Saturday-Sunday:  9:00am - 5:00pm   [phone]                 Follow-ups after your visit        Who to contact     If you have questions or need follow up information about today's clinic visit or your schedule please contact [facility] directly at [phone].  Normal or non-critical lab and imaging results will be communicated to you by MyChart, letter or phone within 4 business days after the clinic has received the results. If you do not hear from us within 7 days, please contact the clinic through MyChart or phone. If you have a critical or abnormal lab result, we will notify you by phone as soon  "as possible.  Submit refill requests through [employer] or call your pharmacy and they will forward the refill request to us. Please allow 3 business days for your refill to be completed.          Additional Information About Your Visit        [employer] Information     [employer] gives you secure access to your electronic health record. If you see a primary care provider, you can also send messages to your care team and make appointments. If you have questions, please call your primary care clinic.  If you do not have a primary care provider, please call [phone] and they will assist you.        Care EveryWhere ID     This is your Care EveryWhere ID. This could be used by other organizations to access your [facility] medical records  [id number]        Your Vitals Were     Pulse Respirations Height BMI (Body Mass Index)          100 16 1.702 m (5' 7\") 30.45 kg/m2         Blood Pressure from Last 3 Encounters:   02/27/18 115/76   02/08/18 119/78   08/05/16 126/73    Weight from Last 3 Encounters:   02/27/18 88.2 kg (194 lb 6.4 oz)   02/08/18 86.2 kg (190 lb)   06/28/17 90.3 kg (199 lb)              Today, you had the following     No orders found for display       Primary Care Provider Office Phone # Fax #    Jairo Hung PA-C [phone]       [address]  [place] [address]        Equal Access to Services     [facility]: Hadii aad ku hadasho Soomaali, waaxda luqadaha, qaybta kaalmada adeegyada, christina jacinto haymaria c comer . So [facility] [phone].    ATENCIÓN: Si habla español, tiene a ibrahim disposición servicios gratuitos de asistencia lingüística. mAadeo al [phone].    We comply with applicable federal civil rights laws and [place] laws. We do not discriminate on the basis of race, color, national origin, age, disability, sex, sexual orientation, or gender identity.            Thank you!     Thank you for choosing [facility]  for your care. Our goal is always to " provide you with excellent care. Hearing back from our patients is one way we can continue to improve our services. Please take a few minutes to complete the written survey that you may receive in the mail after your visit with us. Thank you!             Your Updated Medication List - Protect others around you: Learn how to safely use, store and throw away your medicines at www.disposemymeds.org.          This list is accurate as of 2/27/18  3:38 PM.  Always use your most recent med list.                   Brand Name Dispense Instructions for use Diagnosis    butalbital-acetaminophen-caffeine -40 MG per tablet    FIORICET/ESGIC    28 tablet    Take 1 tablet by mouth every 4 hours as needed    Headache(784.0)       esomeprazole 20 MG CR capsule    nexIUM    90 capsule    Take 1 capsule (20 mg) by mouth every morning (before breakfast) Take 30-60 minutes before eating.    Gastroesophageal reflux disease without esophagitis

## 2018-02-27 NOTE — PROGRESS NOTES
Chief Complaint - oral lesion    History of Present Illness - Chintan Niño is a 37 year old male presents with a lesion on the right lower lip. The patient has noticed for approximately 2 years. It hasn't been changing in size. It isn't painful. No citrus or spicy intolerance. No bleeding. nonsmoker, former chewing tobacco. No lumps or swollen glands in the neck. No blood thinners. Wants it removed.     Past Medical History -   Patient Active Problem List   Diagnosis     GERD (gastroesophageal reflux disease)     CARDIOVASCULAR SCREENING; LDL GOAL LESS THAN 160     Contusion of knee - Right     Contusion of elbow - Right     Attention deficit disorder     Adjustment disorder with mixed anxiety and depressed mood     Lateral epicondylitis, left elbow       Current Medications -   Current Outpatient Prescriptions:      esomeprazole (NEXIUM) 20 MG capsule, Take 1 capsule (20 mg) by mouth every morning (before breakfast) Take 30-60 minutes before eating., Disp: 90 capsule, Rfl: 1     butalbital-acetaminophen-caffeine (FIORICET, ESGIC) -40 MG per tablet, Take 1 tablet by mouth every 4 hours as needed, Disp: 28 tablet, Rfl: 1    Allergies - No Known Allergies    Social History -   Social History     Social History     Marital status:      Spouse name: N/A     Number of children: N/A     Years of education: N/A     Social History Main Topics     Smoking status: Former Smoker     Smokeless tobacco: Former User     Alcohol use No     Drug use: No     Sexual activity: Yes     Partners: Female     Other Topics Concern     None     Social History Narrative       Family History -   Family History   Problem Relation Age of Onset     DIABETES Mother      Retinal detachment Mother      CANCER Father      Bone cancer     Glaucoma Father      CEREBROVASCULAR DISEASE Maternal Grandmother      Asthma No family hx of      C.A.D. No family hx of      Hypertension No family hx of      Breast Cancer No family hx of      Cancer  "- colorectal No family hx of      Prostate Cancer No family hx of      Macular Degeneration No family hx of        Review of Systems - As per HPI and PMHx, otherwise 7 system review of the head and neck negative.    Physical Exam  /76  Pulse 100  Resp 16  Ht 1.702 m (5' 7\")  Wt 88.2 kg (194 lb 6.4 oz)  BMI 30.45 kg/m2  General - The patient is in no distress. Alert and oriented x3, answers questions and cooperates with examination appropriately.   Voice and Breathing - The patient was breathing comfortably without the use of accessory muscles. There was no wheezing, stridor, or stertor.  The patients voice was clear and strong.  Eyes - Extraocular movements intact. Sclera were not icteric or injected, conjunctiva were pink and moist.  Neurologic - Cranial nerves II-XII are grossly intact. Specifically, the facial nerve is intact, House-Brackmann grade 1 of 6.   Mouth - Examination of the oral cavity showed a 2-3 mm lesion located on the right lower lip. On close examination it is pulsatile and tracks under the mucosa. It is a vessel. It maybe a small aneurysm or tortuous part of the vessel. Right buccal mucosa with some trauma from biting. No leukoplakia. The tongue was mobile and protrudes midline.   Oropharynx - The walls of the oropharynx were smooth, symmetric, and had no lesions or ulcerations. The uvula was midline and the palate raised symmetrically.   Neck -  Palpation of the occipital, submental, submandibular, internal jugular chain, and supraclavicular nodes did not demonstrate any abnormal lymph nodes or masses. The parotid glands were without masses. Palpation of the thyroid was soft and smooth, with no nodules or goiter appreciated.  The trachea was midline.      A/P - Chintan Niño is a 37 year old male with a lesion on the right lower lip. It is small, but on close examination it is pulsatile and tracks as a vessel. It maybe a small aneurysm or pseudoaneusym from trauma. It doesn't cause " symptoms. I would recommend not removing it. He agrees.       Johnie Matson MD  Otolaryngology  [facility]

## 2018-02-27 NOTE — PATIENT INSTRUCTIONS
General Scheduling Information  To schedule your CT/MRI scan, please contact [place] [facility] at [phone]   [address] [place] [address]    To schedule your Surgery, please contact our Specialty Schedulers at [phone]    ENT Clinic Locations Clinic Hours Telephone Number     [facility]  [address] [place] [address]   Tuesday:       8:00am -- 4:00pm    Wednesday:  8:00am - 4:00pm   To schedule an appointment with   Dr. Matson,   please contact our   Specialty Scheduling Department at:     [phone]       [facility]  [address] [place] [address]   Friday:          8:00am - 4:00pm         Urgent Care Locations Clinic Hours Telephone Numbers     [facility]  [address] [place] [address]     Monday-Friday:     11:00pm - 9:00pm    Saturday-Sunday:  9:00am - 5:00pm   [phone]     [facility]  [address] [place] [address]     Monday-Friday:      5:00pm - 9:00pm     Saturday-Sunday:  9:00am - 5:00pm   [phone]

## 2019-11-06 ENCOUNTER — OFFICE VISIT (OUTPATIENT)
Dept: FAMILY MEDICINE | Facility: CLINIC | Age: 39
End: 2019-11-06
Payer: COMMERCIAL

## 2019-11-06 ENCOUNTER — TELEPHONE (OUTPATIENT)
Dept: FAMILY MEDICINE | Facility: CLINIC | Age: 39
End: 2019-11-06

## 2019-11-06 VITALS
BODY MASS INDEX: 30.48 KG/M2 | TEMPERATURE: 98.1 F | DIASTOLIC BLOOD PRESSURE: 77 MMHG | WEIGHT: 194.2 LBS | OXYGEN SATURATION: 96 % | HEIGHT: 67 IN | HEART RATE: 74 BPM | SYSTOLIC BLOOD PRESSURE: 119 MMHG | RESPIRATION RATE: 16 BRPM

## 2019-11-06 DIAGNOSIS — K60.2 ANAL FISSURE: ICD-10-CM

## 2019-11-06 DIAGNOSIS — Z13.1 SCREENING FOR DIABETES MELLITUS: ICD-10-CM

## 2019-11-06 DIAGNOSIS — K60.2 ANAL FISSURE: Primary | ICD-10-CM

## 2019-11-06 DIAGNOSIS — Z72.0 TOBACCO ABUSE: ICD-10-CM

## 2019-11-06 DIAGNOSIS — B35.6 TINEA CRURIS: Primary | ICD-10-CM

## 2019-11-06 LAB
GLUCOSE SERPL-MCNC: 99 MG/DL (ref 70–99)
HBA1C MFR BLD: 5.1 % (ref 0–5.6)

## 2019-11-06 PROCEDURE — 82947 ASSAY GLUCOSE BLOOD QUANT: CPT | Performed by: INTERNAL MEDICINE

## 2019-11-06 PROCEDURE — 90686 IIV4 VACC NO PRSV 0.5 ML IM: CPT | Performed by: INTERNAL MEDICINE

## 2019-11-06 PROCEDURE — 99203 OFFICE O/P NEW LOW 30 MIN: CPT | Mod: 25 | Performed by: INTERNAL MEDICINE

## 2019-11-06 PROCEDURE — 36415 COLL VENOUS BLD VENIPUNCTURE: CPT | Performed by: INTERNAL MEDICINE

## 2019-11-06 PROCEDURE — 83036 HEMOGLOBIN GLYCOSYLATED A1C: CPT | Performed by: INTERNAL MEDICINE

## 2019-11-06 PROCEDURE — 90471 IMMUNIZATION ADMIN: CPT | Performed by: INTERNAL MEDICINE

## 2019-11-06 RX ORDER — SUMATRIPTAN 25 MG/1
25 TABLET, FILM COATED ORAL
COMMUNITY
End: 2019-11-06

## 2019-11-06 RX ORDER — FLUCONAZOLE 100 MG/1
100 TABLET ORAL DAILY
Qty: 15 TABLET | Refills: 5 | Status: SHIPPED | OUTPATIENT
Start: 2019-11-06 | End: 2019-12-05

## 2019-11-06 RX ORDER — BUPROPION HYDROCHLORIDE 150 MG/1
150 TABLET, FILM COATED, EXTENDED RELEASE ORAL 2 TIMES DAILY
Qty: 60 TABLET | Refills: 11 | Status: SHIPPED | OUTPATIENT
Start: 2019-11-06 | End: 2020-04-30

## 2019-11-06 ASSESSMENT — MIFFLIN-ST. JEOR: SCORE: 1744.52

## 2019-11-06 ASSESSMENT — PAIN SCALES - GENERAL: PAINLEVEL: MILD PAIN (3)

## 2019-11-06 NOTE — TELEPHONE ENCOUNTER
"[facility] cannot dispence the \"Diltiazem 2% in lipovan cream 2% GEL\"    you ordered as they cannot compound medications.    It will need to go to a compounding pharmacy.    Thank you      "

## 2019-11-06 NOTE — PROGRESS NOTES
Subjective     Chintan Niño is a 39 year old male who presents to clinic today for the following health issues:    HPI   Concern - painful BM's, itchiness  Onset: 3 weeks    Description:   Some blood noted with wiping -   Bumps/hemrrhoids     Intensity: 3/10    Progression of Symptoms:  worsening    Accompanying Signs & Symptoms:  Rash, itchiness    Previous history of similar problem:   None    Precipitating factors:   Worsened by: movement, sitting    Alleviating factors:  Improved by: NA    Therapies Tried and outcome: hydrocortisone, bacitracin         Patient Active Problem List   Diagnosis     GERD (gastroesophageal reflux disease)     CARDIOVASCULAR SCREENING; LDL GOAL LESS THAN 160     Contusion of knee - Right     Contusion of elbow - Right     Attention deficit disorder     Adjustment disorder with mixed anxiety and depressed mood     Lateral epicondylitis, left elbow     Past Surgical History:   Procedure Laterality Date     APPENDECTOMY OPEN CHILD       ENHANCE LASER REFRACTIVE BILATERAL EXISTING PT IN PARAMETERS         Social History     Tobacco Use     Smoking status: Former Smoker     Smokeless tobacco: Former User   Substance Use Topics     Alcohol use: No     Family History   Problem Relation Age of Onset     Diabetes Mother      Retinal detachment Mother      Cancer Father         Bone cancer     Glaucoma Father      Cerebrovascular Disease Maternal Grandmother      Asthma No family hx of      C.A.D. No family hx of      Hypertension No family hx of      Breast Cancer No family hx of      Cancer - colorectal No family hx of      Prostate Cancer No family hx of      Macular Degeneration No family hx of          No Known Allergies  Recent Labs   Lab Test 11/06/19  1053 08/05/16  1428 07/02/14  0833   A1C 5.1  --   --    LDL  --   --  52   HDL  --   --  39*   TRIG  --   --  118   ALT  --  66  --    CR  --  1.23  --    GFRESTIMATED  --  66  --    GFRESTBLACK  --  80  --    POTASSIUM  --  3.9  --   "  TSH  --  2.56  --       BP Readings from Last 3 Encounters:   11/06/19 119/77   02/27/18 115/76   02/08/18 119/78    Wt Readings from Last 3 Encounters:   11/06/19 88.1 kg (194 lb 3.2 oz)   02/27/18 88.2 kg (194 lb 6.4 oz)   02/08/18 86.2 kg (190 lb)                      Reviewed and updated as needed this visit by Provider         Review of Systems   ROS COMP: CONSTITUTIONAL: NEGATIVE for fever, chills, change in weight  INTEGUMENTARY/SKIN: As above.  EYES: NEGATIVE for vision changes or irritation  ENT/MOUTH: NEGATIVE for ear, mouth and throat problems  RESP:NEGATIVE for significant cough or SOB but positive for tobacco use.  CV: NEGATIVE for chest pain, palpitations or peripheral edema  GI: NEGATIVE for nausea, abdominal pain, heartburn, or change in bowel habits  : NEGATIVE for frequency, dysuria, or hematuria  MUSCULOSKELETAL: NEGATIVE for significant arthralgias or myalgia  NEURO: NEGATIVE for weakness, dizziness or paresthesias  ENDOCRINE: NEGATIVE for temperature intolerance, skin/hair changes  HEME: NEGATIVE for bleeding problems  PSYCHIATRIC: NEGATIVE for changes in mood or affect      Objective    /77 (BP Location: Left arm, Patient Position: Sitting, Cuff Size: Adult Regular)   Pulse 74   Temp 98.1  F (36.7  C) (Oral)   Resp 16   Ht 1.702 m (5' 7\")   Wt 88.1 kg (194 lb 3.2 oz)   SpO2 96%   BMI 30.42 kg/m    Body mass index is 30.42 kg/m .  Physical Exam   GENERAL: healthy, alert and no distress  EYES: Eyes grossly normal to inspection  HENT: normal cephalic/atraumatic and oral mucous membranes moist  MS: no gross musculoskeletal defects noted, no edema  SKIN: Erythema of the gluteal folds.  NEURO: Normal strength and tone, mentation intact and speech normal  PSYCH: mentation appears normal, affect normal/bright    Diagnostic Test Results:  Results for orders placed or performed in visit on 11/06/19   Hemoglobin A1c     Status: None   Result Value Ref Range    Hemoglobin A1C 5.1 0 - 5.6 % " "  Glucose     Status: None   Result Value Ref Range    Glucose 99 70 - 99 mg/dL           Assessment & Plan     1. Tinea cruris  Noted on gluteal folds.   - fluconazole (DIFLUCAN) 100 MG tablet; Take 1 tablet (100 mg) by mouth daily for 15 days  Dispense: 15 tablet; Refill: 5    2. Anal fissure  Probable cause of rectal pain while defecating.  -Diltiazem 2% gel.    3. Tobacco abuse  Patient is motivated to quit smoking.  - buPROPion (ZYBAN) 150 MG 12 hr tablet; Take 1 tablet (150 mg) by mouth 2 times daily  Dispense: 60 tablet; Refill: 11    4. Screening for diabetes mellitus  Negative test despite finding of tinea cruris.  - Hemoglobin A1c  - Glucose     BMI:   Estimated body mass index is 30.42 kg/m  as calculated from the following:    Height as of this encounter: 1.702 m (5' 7\").    Weight as of this encounter: 88.1 kg (194 lb 3.2 oz).   Weight management plan: diet and exercise.        FUTURE APPOINTMENTS:       - Follow-up visit in 4 weeks or as needed.      Shyam Gee MD  [facility]        "

## 2019-11-06 NOTE — TELEPHONE ENCOUNTER
Rx for Diltiazem 2% gel sent electronically to [facility] ([address] [place] [address]).                       Notify patient about it.

## 2019-11-15 PROBLEM — B35.6 TINEA CRURIS: Status: ACTIVE | Noted: 2019-11-15

## 2019-11-15 PROBLEM — Z72.0 TOBACCO ABUSE: Status: ACTIVE | Noted: 2019-11-15

## 2019-11-15 PROBLEM — K60.2 ANAL FISSURE: Status: ACTIVE | Noted: 2019-11-15

## 2019-11-30 ENCOUNTER — MYC MEDICAL ADVICE (OUTPATIENT)
Dept: FAMILY MEDICINE | Facility: CLINIC | Age: 39
End: 2019-11-30

## 2019-12-02 NOTE — TELEPHONE ENCOUNTER
Patient having persisting symptoms since last OV on 11/6/19.  Recommended E-visit or RTC for further evaluation.    Yee Winston RN  [facility]

## 2019-12-05 ENCOUNTER — OFFICE VISIT (OUTPATIENT)
Dept: FAMILY MEDICINE | Facility: CLINIC | Age: 39
End: 2019-12-05
Payer: COMMERCIAL

## 2019-12-05 VITALS
TEMPERATURE: 98.3 F | WEIGHT: 201 LBS | OXYGEN SATURATION: 96 % | HEIGHT: 67 IN | SYSTOLIC BLOOD PRESSURE: 120 MMHG | BODY MASS INDEX: 31.55 KG/M2 | DIASTOLIC BLOOD PRESSURE: 74 MMHG | HEART RATE: 74 BPM

## 2019-12-05 DIAGNOSIS — G43.709 CHRONIC MIGRAINE WITHOUT AURA WITHOUT STATUS MIGRAINOSUS, NOT INTRACTABLE: ICD-10-CM

## 2019-12-05 DIAGNOSIS — B35.6 TINEA CRURIS: Primary | ICD-10-CM

## 2019-12-05 PROCEDURE — 99214 OFFICE O/P EST MOD 30 MIN: CPT | Performed by: INTERNAL MEDICINE

## 2019-12-05 RX ORDER — TERBINAFINE HYDROCHLORIDE 250 MG/1
250 TABLET ORAL DAILY
Qty: 28 TABLET | Refills: 2 | Status: SHIPPED | OUTPATIENT
Start: 2019-12-05 | End: 2020-05-12

## 2019-12-05 RX ORDER — AMITRIPTYLINE HYDROCHLORIDE 10 MG/1
TABLET ORAL
Qty: 30 TABLET | Refills: 5 | Status: SHIPPED | OUTPATIENT
Start: 2019-12-05 | End: 2020-04-30

## 2019-12-05 RX ORDER — PRENATAL VIT 91/IRON/FOLIC/DHA 28-975-200
COMBINATION PACKAGE (EA) ORAL 2 TIMES DAILY
Qty: 30 G | Refills: 5 | Status: SHIPPED | OUTPATIENT
Start: 2019-12-05 | End: 2020-04-30

## 2019-12-05 ASSESSMENT — PAIN SCALES - GENERAL: PAINLEVEL: NO PAIN (0)

## 2019-12-05 ASSESSMENT — PATIENT HEALTH QUESTIONNAIRE - PHQ9: SUM OF ALL RESPONSES TO PHQ QUESTIONS 1-9: 1

## 2019-12-05 ASSESSMENT — MIFFLIN-ST. JEOR: SCORE: 1775.36

## 2019-12-05 NOTE — PROGRESS NOTES
Subjective     Chintan Niño is a 39 year old male who presents to clinic today for the following health issues:    HPI   Rash  Onset: over 1 1/2 months    Description:   Location: Butt  Character: burning, red, painful  Itching (Pruritis): YES    Progression of Symptoms:  worsening and constant    Accompanying Signs & Symptoms:  Fever: no   Body aches or joint pain: no   Sore throat symptoms: no   Recent cold symptoms: no     History:   Previous similar rash: YES    Precipitating factors:   Exposure to similar rash: no   New exposures: None   Recent travel: no     Alleviating factors:  none  Therapies Tried and outcome: Fluconazole given on 11/16/19 (not effective).      Migraine     Since your last clinic visit, how have your headaches changed?  Worsened    How often are you getting headaches or migraines? 3-4x/week     Are you able to do normal daily activities when you have a migraine? Yes    Are you taking rescue/relief medications? (Select all that apply) sumatriptan (Imitrex)    How helpful is your rescue/relief medication?  The relief is inconsistent    Are you taking any medications to prevent migraines? (Select all that apply)  No    In the past 4 weeks, how often have you gone to urgent care or the emergency room because of your headaches?  0         Patient Active Problem List   Diagnosis     GERD (gastroesophageal reflux disease)     CARDIOVASCULAR SCREENING; LDL GOAL LESS THAN 160     Contusion of knee - Right     Contusion of elbow - Right     Attention deficit disorder     Adjustment disorder with mixed anxiety and depressed mood     Lateral epicondylitis, left elbow     Tinea cruris     Anal fissure     Tobacco abuse     Chronic migraine without aura without status migrainosus, not intractable     Past Surgical History:   Procedure Laterality Date     APPENDECTOMY OPEN CHILD       ENHANCE LASER REFRACTIVE BILATERAL EXISTING PT IN PARAMETERS         Social History     Tobacco Use     Smoking status:  Former Smoker     Smokeless tobacco: Former User   Substance Use Topics     Alcohol use: No     Family History   Problem Relation Age of Onset     Diabetes Mother      Retinal detachment Mother      Cancer Father         Bone cancer     Glaucoma Father      Cerebrovascular Disease Maternal Grandmother      Asthma No family hx of      C.A.D. No family hx of      Hypertension No family hx of      Breast Cancer No family hx of      Cancer - colorectal No family hx of      Prostate Cancer No family hx of      Macular Degeneration No family hx of          No Known Allergies  Recent Labs   Lab Test 11/06/19  1053 08/05/16  1428 07/02/14  0833   A1C 5.1  --   --    LDL  --   --  52   HDL  --   --  39*   TRIG  --   --  118   ALT  --  66  --    CR  --  1.23  --    GFRESTIMATED  --  66  --    GFRESTBLACK  --  80  --    POTASSIUM  --  3.9  --    TSH  --  2.56  --       BP Readings from Last 3 Encounters:   12/05/19 120/74   11/06/19 119/77   02/27/18 115/76    Wt Readings from Last 3 Encounters:   12/05/19 91.2 kg (201 lb)   11/06/19 88.1 kg (194 lb 3.2 oz)   02/27/18 88.2 kg (194 lb 6.4 oz)                      Reviewed and updated as needed this visit by Provider         Review of Systems   ROS COMP: CONSTITUTIONAL: NEGATIVE for fever, chills, change in weight  INTEGUMENTARY/SKIN: As above.  EYES: NEGATIVE for vision changes or irritation  ENT/MOUTH: NEGATIVE for ear, mouth and throat problems  RESP: NEGATIVE for significant cough or SOB  CV: NEGATIVE for chest pain, palpitations or peripheral edema  GI: NEGATIVE for nausea, abdominal pain, heartburn, or change in bowel habits  : NEGATIVE for frequency, dysuria, or hematuria  MUSCULOSKELETAL: NEGATIVE for significant arthralgias or myalgia  NEURO: NEGATIVE for HX CVA, HX TIA, involuntary movements and gait disturbance  ENDOCRINE: NEGATIVE for temperature intolerance, skin/hair changes  HEME: NEGATIVE for bleeding problems  PSYCHIATRIC: NEGATIVE for changes in mood or  "affect      Objective    /74 (BP Location: Left arm, Patient Position: Chair, Cuff Size: Adult Large)   Pulse 74   Temp 98.3  F (36.8  C) (Oral)   Ht 1.702 m (5' 7\")   Wt 91.2 kg (201 lb)   SpO2 96%   BMI 31.48 kg/m    Body mass index is 31.48 kg/m .  Physical Exam   GENERAL: healthy, alert and no distress  EYES: Eyes grossly normal to inspection  HENT: normal cephalic/atraumatic and oral mucous membranes moist  RESP: lungs clear to auscultation - no rales, rhonchi or wheezes  CV: regular rate and rhythm, normal S1 S2, no S3 or S4, no murmur, click or rub, no peripheral edema and peripheral pulses strong  MS: no gross musculoskeletal defects noted, no edema  SKIN: Erythema of the perineum and perirectal area.  NEURO: Normal strength and tone, mentation intact and speech normal  PSYCH: mentation appears normal, affect normal/bright    Diagnostic Test Results:  Labs reviewed in Epic        Assessment & Plan     1. Tinea cruris  Not responsive to oral Fluconazole. Switch to different oral anti-fungal agent PLUS topical anti-fungal agent.  - terbinafine (LAMISIL) 250 MG tablet; Take 1 tablet (250 mg) by mouth daily for 28 doses  Dispense: 28 tablet; Refill: 2  - terbinafine (LAMISIL) 1 % external cream; Apply topically 2 times daily  Dispense: 30 g; Refill: 5  - DERMATOLOGY REFERRAL    2. Chronic migraine without aura without status migrainosus, not intractable  Prophylactic drug necessary to reduce frequency of headaches.  - SUMAtriptan (IMITREX STATDOSE) 6 MG/0.5ML refill cartridge; Inject 0.5 mLs (6 mg) Subcutaneous at onset of headache for migraine  Dispense: 4 Cartridge; Refill: 11  - amitriptyline (ELAVIL) 10 MG tablet; Take 1 tablet one hour before bedtime.  Dispense: 30 tablet; Refill: 5       FUTURE APPOINTMENTS:       - Follow-up visit in one month or as needed.        Shyam Gee MD  [facility]      "

## 2019-12-05 NOTE — PATIENT INSTRUCTIONS
At [facility], we strive to deliver an exceptional experience to you, every time we see you. If you receive a survey, please complete it as we do value your feedback.  If you have MyChart, you can expect to receive results automatically within 24 hours of their completion.  Your provider will send a note interpreting your results as well.   If you do not have MyChart, you should receive your results in about a week by mail.    Your care team:                            Family Medicine Internal Medicine   MD Shyam Sanchez MD Shantel Branch-Fleming, MD Katya Georgiev PA-C Megan Hill, APRPREETI Silva, MD Pediatrics   Ronald Naidu, PAPumaC  Eliane Villanueva, MD Camila Meija APRN CNP   MD Cinthia Lomas MD Deborah Mielke, MD Kim Thein, APRN CNP      Clinic hours: Monday - Thursday 7 am-7 pm; Fridays 7 am-5 pm.   Urgent care: Monday - Friday 11 am-9 pm; Saturday and Sunday 9 am-5 pm.  Pharmacy : Monday -Thursday 8 am-8 pm; Friday 8 am-6 pm; Saturday and Sunday 9 am-5 pm.     Clinic: [phone]   Pharmacy: [phone]

## 2019-12-15 PROBLEM — G43.709 CHRONIC MIGRAINE WITHOUT AURA WITHOUT STATUS MIGRAINOSUS, NOT INTRACTABLE: Status: ACTIVE | Noted: 2019-12-15

## 2020-03-01 ENCOUNTER — HEALTH MAINTENANCE LETTER (OUTPATIENT)
Age: 40
End: 2020-03-01

## 2020-03-23 ENCOUNTER — MYC MEDICAL ADVICE (OUTPATIENT)
Dept: FAMILY MEDICINE | Facility: CLINIC | Age: 40
End: 2020-03-23

## 2020-04-18 ENCOUNTER — MYC MEDICAL ADVICE (OUTPATIENT)
Dept: FAMILY MEDICINE | Facility: CLINIC | Age: 40
End: 2020-04-18

## 2020-04-24 ENCOUNTER — VIRTUAL VISIT (OUTPATIENT)
Dept: DERMATOLOGY | Facility: CLINIC | Age: 40
End: 2020-04-24
Payer: COMMERCIAL

## 2020-04-24 DIAGNOSIS — R21 RASH: Primary | ICD-10-CM

## 2020-04-24 PROCEDURE — 99202 OFFICE O/P NEW SF 15 MIN: CPT | Mod: GT | Performed by: DERMATOLOGY

## 2020-04-24 RX ORDER — MUPIROCIN 20 MG/G
OINTMENT TOPICAL
Qty: 30 G | Refills: 0 | Status: SHIPPED | OUTPATIENT
Start: 2020-04-24 | End: 2022-12-07

## 2020-04-24 ASSESSMENT — PAIN SCALES - GENERAL: PAINLEVEL: NO PAIN (0)

## 2020-04-24 NOTE — Clinical Note
Pt coming in at ten am Monday for culture of buttocks with RN. And I will see him via phone and photos in 2 weeks.

## 2020-04-24 NOTE — PATIENT INSTRUCTIONS
[facility] Teledermatology Visit    Thank you for allowing us to participate in your care. Your findings, instructions and follow-up plan are as follows:    Obtain cultureon Monday at 10am ([facility], 3rd floor dermatology, check in)  After culture start mupirocin three times daily  Dove sensitive skin soap once daily  Toilet paper  For any pain, thin layer of vaseline petroleum jelly  Nothing else    When should I call my doctor?    If you are worsening or not improving, please, contact us or seek urgent care as noted below.     Who should I call with questions (adults)?    [facility] (adult and pediatric): [phone]     [facility] (adult): [phone]    For urgent needs outside of business hours call the [facility] at [phone] and ask for the dermatology resident on call    If this is a medical emergency and you are unable to reach an ER, Call 911      Who should I call with questions (pediatric)?  [facility]- Pediatric Dermatology  Dr. Viviana John, Dr. Maynor Lang, Dr. Brianne Madrid, Yanira Hunter, PA  Dr. Brigid Garcia, Dr. Gretchen Venegas & Dr. Garcia Lopez  Non Urgent  Nurse Triage Line; [phone]- Zara and Charu ELIAS Care Coordinators   Stephanie (/Complex ) [phone]    If you need a prescription refill, please contact your pharmacy. Refills are approved or denied by our Physicians during normal business hours, Monday through Fridays  Per office policy, refills will not be granted if you have not been seen within the past year (or sooner depending on your child's condition)    Scheduling Information:  Pediatric Appointment Scheduling and Call Center [phone]  Radiology Scheduling- [phone]  Sedation Unit Scheduling- [phone]  [place] Scheduling- General [phone]; Pediatric Dermatology [phone]  [facility]   Services: [phone]  Nigerien: [phone]  Taiwanese: [phone]  Hmong/Bahamian/Welsh: [phone]  Preadmission Nursing Department Fax Number: [phone] (Fax all pre-operative paperwork to this number)    For urgent matters arising during evenings, weekends, or holidays that cannot wait for normal business hours please call [phone] and ask for the Dermatology Resident On-Call to be paged.

## 2020-04-24 NOTE — NURSING NOTE
"Teledermatology Nurse Call for NEW Patients (not seen in last 3 years):     The patient was contacted by phone and we reviewed, \"Due to the coronavirus pandemic, we are calling to reschedule your upcoming visit and offer you a teledermatology visit. This would be assessed by an MD or PAMEGAN;  Importantly, \"a teledermatology visit may not be as thorough as an in-person visit, and the quality of the photograph and/or video sent may not be of the same quality as that taken by the dermatology clinic.\" After discussing the risks, benefits, and alternatives, the patient would like to proceed with teledermatology because of National Emergency Regarding Coronavirus disease (COVID 19) Outbreak.\"    This video visit will be conducted via a call between you and your physician/provider via [employer]. We have found that certain health care needs can be provided without the need for an in-person physical exam.  This service lets us provide the care you need with a video conversation.  If a prescription is necessary we can send it directly to your pharmacy.  If lab work is needed we can place an order for that and you can then stop by our lab to have the test done at a later time.If during the course of the call the physician/provider feels a video visit is not appropriate, you will not be charged for this service.    Patient would like the video invitation sent by: Text to cell phone: [phone]     The patient will also send photographs via [employer] for review.       The patient verified the location of the photo/video to be their home address.      For photos, patient told nursing these are already uploaded     The patient was instructed to take photos of all areas of concern and all areas of any rash.       The patient denied skin pain, fever, mucosal symptoms(lesions, blisters, sores in the mouth, nose, eyes, or genitals).  IF PATIENT ENDORSES ANY OF THESE STOP AND PAGE ON CALL ATTENDING    Additional notes:  Patient summary of " issue: Patient voices that the area is painful and red with some drainage.   Location of problem on body: Gluteal cleft   How long has area/symptoms been present: Symptoms occurred since Dec. 2019  What makes it better?: Taking showers and drying the area.   What makes it worse?: Using the toilet or sitting down. He's having trouble with wiping the area due to pain - uses toilet paper, no wipes used.   Other symptoms include the following: Painful, red.  Which mediations have been tried, for how long, and did they make it better or worse (ex. Triamcinolone, used twice daily for 2 weeks, not improved): He reported that he used Jock itch OTC cream BID - AM & PM. That does work, but did not clear up the area so he stopped using it 5 days ago. In the past Dilitazem was prescribed for anal fissure & he reported have never used it.  He has tried OTC cortisone cream and that did not help at all. Altogether, no current therapies.   The patient has seen a dermatologist in the past for mole check and dry scalp.   The patient hasno past medical history of skin cancer  ROS: The patient is generally feeling well.       Nursing tasks completed  -Pharmacy preference was updated.  -The nurse has dropped in the AVS information *(For adults the phrase is umdermhteleavs and for pediatrics it is their own) for the physician to route in the AVS.                                                                                                                                                                                                                         -The patient was told to contact the clinic if they have not received correspondence within 72 hours.

## 2020-04-24 NOTE — PROGRESS NOTES
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 Earlier for new or changing lesions or rash.      Staff only:    Cheryl Greer MD    Department of Dermatology  [facility] [place]: Phone: [phone], Fax:[phone]  [facility]: Phone: [phone], Fax: [phone]        _____________________________________________________________________________    Dermatology Problem List:  Gluteal cleft eruption    Encounter Date: Apr 24, 2020    CC:   Chief Complaint   Patient presents with     Derm Problem     jock itch - gluteal cleft       History of Present Illness:  I have reviewed the teledermatology information and the nursing intake corresponding to this issue. Chintan Niño is a 39 year old male who presents via teledermatology for rash on buttocks. Started 12/2019. Showers and drying area helps. Using the toilet or sitting down makes it worse. Has trouble wiping area due to pain. Not using wipes, just toilet paper. Has been painful and red. Tried jock itch cream and helped. Was given medication for anal fissure, diltiazem. . Tried OTC cortisone cream did not help. Has seen a derm for mole check prior and dry scalp. NO hx of skin cancer. Failed fluconazole and po terbinafine.   Has not had any testing.     No history of psoriasis.   ROS: Patient is  generally feeling well today     Physical Examination:  General: Well-appearingappropriately-developed individual.  Skin: Focused examination within the teledermatology photograph(s) including  was performed.   -erythematous patch on the buttocks, possibly involving perianal    Labs:  NA  Past Medical History:   Patient Active Problem List   Diagnosis     GERD (gastroesophageal reflux disease)     CARDIOVASCULAR SCREENING; LDL GOAL LESS THAN 160     Contusion of knee - Right     Contusion of elbow - Right     Attention deficit disorder     Adjustment disorder with mixed anxiety and depressed mood     Lateral epicondylitis, left elbow     Tinea cruris     Anal fissure     Tobacco abuse     Chronic migraine without aura without status migrainosus, not intractable     Past Medical History:   Diagnosis Date     GERD (gastroesophageal reflux disease) 1/20/2011     Past Surgical History:   Procedure Laterality Date     APPENDECTOMY OPEN CHILD       ENHANCE LASER REFRACTIVE BILATERAL EXISTING PT IN PARAMETERS         Social History:  Patient reports that he has quit smoking. He has quit using smokeless tobacco. He reports that he does not drink alcohol or use drugs.    Family History:  Family History   Problem Relation Age of Onset     Diabetes Mother      Retinal detachment Mother      Cancer Father         Bone cancer     Glaucoma Father      Cerebrovascular Disease Maternal Grandmother      Asthma No family hx of      C.A.D. No family hx of      Hypertension No family hx of      Breast Cancer No family hx of      Cancer - colorectal No family hx of      Prostate Cancer No family hx of      Macular Degeneration No family hx of      Brother with psoriasis  Medications:  Current Outpatient Medications   Medication     amitriptyline (ELAVIL) 10 MG tablet     buPROPion (ZYBAN) 150 MG 12 hr tablet     SUMAtriptan (IMITREX STATDOSE) 6 MG/0.5ML refill cartridge     diltiazem 2% in lipovan cream 2% GEL     terbinafine (LAMISIL)  1 % external cream     No current facility-administered medications for this visit.           No Known Allergies      _____________________________________________________________________________    Teledermatology information:  - Location of patient: Other office  - Patient presented as: provider referral  - Location of teledermatologist:  ([facility] )  - Reason teledermatology is appropriate:  of National Emergency Regarding Coronavirus disease (COVID 19) Outbreak  - Image quality and interpretability: acceptable  - Physician has received verbal consent for a Video/Photos Visit from the patient? Yes  - In-person dermatology visit recommendation: yes - for culture today but declined and scheduled for monday  - Date of images: 4/21/2020  - Service start time:11:15 am  - Service end time:11:20 am approx  - Date of report: 4/24/2020

## 2020-04-27 ENCOUNTER — ALLIED HEALTH/NURSE VISIT (OUTPATIENT)
Dept: NURSING | Facility: CLINIC | Age: 40
End: 2020-04-27
Payer: COMMERCIAL

## 2020-04-27 DIAGNOSIS — L53.9 ERYTHEMATOUS CONDITION: Primary | ICD-10-CM

## 2020-04-27 DIAGNOSIS — K62.89 PERIANAL STREP: ICD-10-CM

## 2020-04-27 DIAGNOSIS — B95.5 PERIANAL STREP: ICD-10-CM

## 2020-04-27 PROCEDURE — 87070 CULTURE OTHR SPECIMN AEROBIC: CPT | Performed by: DERMATOLOGY

## 2020-04-27 PROCEDURE — 87186 SC STD MICRODIL/AGAR DIL: CPT | Performed by: DERMATOLOGY

## 2020-04-27 PROCEDURE — 87077 CULTURE AEROBIC IDENTIFY: CPT | Performed by: DERMATOLOGY

## 2020-04-27 PROCEDURE — 99207 ZZC NO CHARGE NURSE ONLY: CPT | Performed by: DERMATOLOGY

## 2020-04-27 NOTE — NURSING NOTE
Chintan Niño comes into clinic today at the request of Dr. Greer Ordering Provider for skin culture.    Skin culture obtained from gluteal cleft. Patient tolerated swab with minimal discomfort.  Patient notified that results will be available in ~48 hours.    This service provided today was under the supervising provider of the day Dr. Louis, who was available if needed.    Regi Mackenzie RN

## 2020-04-28 RX ORDER — PENICILLIN V POTASSIUM 500 MG/1
TABLET, FILM COATED ORAL
Qty: 56 TABLET | Refills: 0 | Status: SHIPPED | OUTPATIENT
Start: 2020-04-28 | End: 2020-05-08

## 2020-04-28 NOTE — PROGRESS NOTES
Called pt, no hx of penicillin allergy. Understands that antibiotic may need to be changed. Decrease mupirocin to twice daily

## 2020-04-30 ENCOUNTER — VIRTUAL VISIT (OUTPATIENT)
Dept: FAMILY MEDICINE | Facility: CLINIC | Age: 40
End: 2020-04-30
Payer: COMMERCIAL

## 2020-04-30 VITALS — WEIGHT: 195 LBS | BODY MASS INDEX: 30.54 KG/M2

## 2020-04-30 DIAGNOSIS — G43.709 CHRONIC MIGRAINE WITHOUT AURA WITHOUT STATUS MIGRAINOSUS, NOT INTRACTABLE: Primary | ICD-10-CM

## 2020-04-30 LAB
BACTERIA SPEC CULT: ABNORMAL
Lab: ABNORMAL
SPECIMEN SOURCE: ABNORMAL

## 2020-04-30 PROCEDURE — 99214 OFFICE O/P EST MOD 30 MIN: CPT | Mod: TEL | Performed by: PHYSICIAN ASSISTANT

## 2020-04-30 RX ORDER — SUMATRIPTAN 20 MG/1
1 SPRAY NASAL PRN
Qty: 1 EACH | Refills: 3 | Status: SHIPPED | OUTPATIENT
Start: 2020-04-30 | End: 2020-08-18

## 2020-04-30 RX ORDER — METOCLOPRAMIDE 10 MG/1
10 TABLET ORAL 2 TIMES DAILY PRN
Qty: 30 TABLET | Refills: 3 | Status: SHIPPED | OUTPATIENT
Start: 2020-04-30 | End: 2020-05-12

## 2020-04-30 RX ORDER — VENLAFAXINE HYDROCHLORIDE 37.5 MG/1
CAPSULE, EXTENDED RELEASE ORAL
Qty: 97 CAPSULE | Refills: 0 | Status: SHIPPED | OUTPATIENT
Start: 2020-04-30 | End: 2022-12-07

## 2020-04-30 ASSESSMENT — PAIN SCALES - GENERAL: PAINLEVEL: NO PAIN (0)

## 2020-04-30 NOTE — PROGRESS NOTES
"Chintan Niño is a 39 year old male who is being evaluated via a billable telephone visit.      The patient has been notified of following:     \"This telephone visit will be conducted via a call between you and your physician/provider. We have found that certain health care needs can be provided without the need for a physical exam.  This service lets us provide the care you need with a short phone conversation.  If a prescription is necessary we can send it directly to your pharmacy.  If lab work is needed we can place an order for that and you can then stop by our lab to have the test done at a later time.    Telephone visits are billed at different rates depending on your insurance coverage. During this emergency period, for some insurers they may be billed the same as an in-person visit.  Please reach out to your insurance provider with any questions.    If during the course of the call the physician/provider feels a telephone visit is not appropriate, you will not be charged for this service.\"    Patient has given verbal consent for Telephone visit?  Yes    How would you like to obtain your AVS? Lisahart    Subjective     Chintan Niño is a 39 year old male who presents to clinic today for the following health issues:    HPI  Patient is doing well.    No acute migraine.     amitriptyline didn't help too much.    Has HAs basically every other day.    sumitriptan helps the headache but makes him feel wierd.      I do see topamax in 2016 on patient's list but he doesn't remember this.        Reviewed and updated as needed this visit by Provider         Review of Systems   ROS COMP: ENT/MOUTH: tinnitus bilateral  GI: nausea w/migraines  NEURO: Hx headaches-migraine       Objective   Reported vitals:  Wt 88.5 kg (195 lb)   BMI 30.54 kg/m     healthy, alert and no distress  PSYCH: Alert and oriented times 3; coherent speech, normal   rate and volume, able to articulate logical thoughts, able   to abstract reason, no " tangential thoughts, no hallucinations   or delusions  His affect is normal  RESP: No cough, no audible wheezing, able to talk in full sentences  Remainder of exam unable to be completed due to telephone visits    Diagnostic Test Results:  none         Assessment/Plan:  1. Chronic migraine without aura without status migrainosus, not intractable  Start venlafxine taper  For prevention.  Try reglan and nasal triptan for acute, Would consider alt triptan to see if tolerated bbetter  - venlafaxine (EFFEXOR-XR) 37.5 MG 24 hr capsule; Take 1 capsule (37.5 mg) by mouth every morning for 3 days, THEN 2 capsules (75 mg) every morning for 7 days, THEN 4 capsules (150 mg) every morning for 20 days.  Dispense: 97 capsule; Refill: 0  - metoclopramide (REGLAN) 10 MG tablet; Take 1 tablet (10 mg) by mouth 2 times daily as needed (nausea , vomiting or migraine)  Dispense: 30 tablet; Refill: 3  - SUMAtriptan (IMITREX) 20 MG/ACT nasal spray; Spray 1 spray in nostril as needed for migraine May repeat in 2 hours. Max 2 sprays/24 hours.  Dispense: 1 each; Refill: 3    Return in about 4 weeks (around 5/28/2020) for Routine Visit.      Phone call duration:  21 minutes    MEGHAN Valle

## 2020-05-04 RX ORDER — SULFAMETHOXAZOLE/TRIMETHOPRIM 800-160 MG
1 TABLET ORAL 2 TIMES DAILY
Qty: 20 TABLET | Refills: 0 | Status: SHIPPED | OUTPATIENT
Start: 2020-05-04 | End: 2020-05-12

## 2020-05-05 ENCOUNTER — TELEPHONE (OUTPATIENT)
Dept: DERMATOLOGY | Facility: CLINIC | Age: 40
End: 2020-05-05

## 2020-05-05 NOTE — TELEPHONE ENCOUNTER
"Notes recorded by Ct Lu LPN on 5/5/2020 at 8:39 AM CDT   Spoke to patient regarding note below. Patient picked up Bactrim yesterday. Took first dose this morning. Advised to discontinue if he develops rash. No further questions or concerns at this time.   Ct Lu LPN     ------     Notes recorded by Cheryl Greer MD on 5/4/2020 at 5:29 PM CDT   Stop this antibiotic and try bactrim. Stop if develops rash right away. This antibiotic has rare risks of blood count issues and severe rash.   ------     Notes recorded by Leatha Jones LPN on 5/4/2020 at 10:09 AM CDT   Writer called and spoke with patient. Patient states he started the antibiotic. (4 pills the first day of 500mg. Has now gone down to 3 pills a day as he does not \"want to take one in the middle of the night\"). Patient reports that the medication is causing some nausea without vommitting. Writer inquired if patient has been taking medication with food and he stated he is taking it with Activia. Patient interested in alternative antibiotic. Patient reports he as not \"checked the area today\" but states it is no longer \"itchy and painful\". Routing to Dr. Greer to advise.     Leatha Jones LPN     ------     Notes recorded by Cheryl Greer MD on 5/1/2020 at 10:42 AM CDT   Call Monday and see how he is doing   ------     Notes recorded by Cheryl Greer MD on 4/28/2020 at 5:00 PM CDT   Called pt, no hx of penicillin allergy. Understands that antibiotic may need to be changed. Decrease mupirocin to twice daily, weight 200 pounds. All consistent with image and perianal strep  "

## 2020-05-06 ENCOUNTER — NURSE TRIAGE (OUTPATIENT)
Dept: NURSING | Facility: CLINIC | Age: 40
End: 2020-05-06

## 2020-05-06 ENCOUNTER — COMMUNICATION - HEALTHEAST (OUTPATIENT)
Dept: SCHEDULING | Facility: CLINIC | Age: 40
End: 2020-05-06

## 2020-05-06 NOTE — TELEPHONE ENCOUNTER
"Patient calling reporting he was increasing Venlafaxine to 2 tablets per day and having loss of appetite along with sweating, yawning, nausea, and fatigue. Patient is on Bactrim DS for a strep infection. Stating he thought originally symptoms were related to Penicillin so his antibiotics were changed to Bactrim DS on 5/4/20 for (perianal strep). Patient stating he decreased the Venlafaxine to 1 tablet today to wean off of it. Patient is questioning stopping the medication. Stating he had \"a really bad migraine yesterday.\" Afebrile. Denies other symptoms.     COVID 19 Nurse Triage Plan/Patient Instructions    Please be aware that novel coronavirus (COVID-19) may be circulating in the community. If you develop symptoms such as fever, cough, or SOB or if you have concerns about the presence of another infection including coronavirus (COVID-19), please contact your health care provider or visit [email or web address].     Disposition/Instructions    Patient to have scheduled Telephone Visit with a provider. Follow System Ambulatory Workflow for COVID 19.     The clinic staff will assist you to schedule an appointment to complete the Telephone Visit with a provider during normal clinic hours.       Call Back If: Your symptoms worsen before you are able to complete your Telephone Visit with a provider.    Thank you for limiting contact with others, wearing a simple mask to cover your cough, practice good hand hygiene habits and accessing our virtual services where possible to limit the spread of this virus.    For more information about COVID19 and options for caring for yourself at home, please visit the CDC website at https://www.cdc.gov/coronavirus/2019-ncov/about/steps-when-sick.html  For more options for care at [facility], please visit our website at [email or web address]    For more information, please use the [place] Department of Health COVID-19 Website: " "https://www.health.[facility].[place]/diseases/coronavirus/index.html  [place] Department of Health ([facility]) COVID-19 Hotlines (Interpreters available):      Health questions: Phone Number: [phone] or [phone] and Hours: 7 a.m. to 7 p.m.    Schools and  questions: Phone Number: [phone] or [phone] and Hours 7 a.m. to 7 p.m.                    Reason for Disposition    Caller has NON-URGENT medication question about med that PCP prescribed and triager unable to answer question    Additional Information    Negative: Drug overdose and nurse unable to answer question    Negative: Caller requesting information not related to medicine    Negative: Caller requesting a prescription for Strep throat and has a positive culture result    Negative: Rash while taking a medication or within 3 days of stopping it    Negative: Immunization reaction suspected    Negative: [1] Asthma and [2] having symptoms of asthma (cough, wheezing, etc)    Negative: MORE THAN A DOUBLE DOSE of a prescription or over-the-counter (OTC) drug    Negative: [1] DOUBLE DOSE (an extra dose or lesser amount) of over-the-counter (OTC) drug AND [2] any symptoms (e.g., dizziness, nausea, pain, sleepiness)    Negative: [1] DOUBLE DOSE (an extra dose or lesser amount) of prescription drug AND [2] any symptoms (e.g., dizziness, nausea, pain, sleepiness)    Negative: Took another person's prescription drug    Negative: [1] DOUBLE DOSE (an extra dose or lesser amount) of prescription drug AND [2] NO symptoms (Exception: a double dose of antibiotics)    Negative: Diabetes drug error or overdose (e.g., insulin or extra dose)    Negative: [1] Request for URGENT new prescription or refill of \"essential\" medication (i.e., likelihood of harm to patient if not taken) AND [2] triager unable to fill per unit policy    Negative: [1] Prescription not at pharmacy AND [2] was prescribed today by PCP    Negative: Pharmacy calling with prescription " questions and triager unable to answer question    Negative: Caller has URGENT medication question about med that PCP prescribed and triager unable to answer question    Protocols used: MEDICATION QUESTION CALL-A-AH

## 2020-05-08 ENCOUNTER — VIRTUAL VISIT (OUTPATIENT)
Dept: DERMATOLOGY | Facility: CLINIC | Age: 40
End: 2020-05-08
Payer: COMMERCIAL

## 2020-05-08 ENCOUNTER — TELEPHONE (OUTPATIENT)
Dept: DERMATOLOGY | Facility: CLINIC | Age: 40
End: 2020-05-08

## 2020-05-08 DIAGNOSIS — L30.9 DERMATITIS: Primary | ICD-10-CM

## 2020-05-08 PROCEDURE — 99213 OFFICE O/P EST LOW 20 MIN: CPT | Mod: TEL | Performed by: DERMATOLOGY

## 2020-05-08 RX ORDER — HYDROCORTISONE 25 MG/G
OINTMENT TOPICAL
Qty: 30 G | Refills: 0 | Status: SHIPPED | OUTPATIENT
Start: 2020-05-08 | End: 2020-05-12

## 2020-05-08 ASSESSMENT — PAIN SCALES - GENERAL: PAINLEVEL: NO PAIN (0)

## 2020-05-08 NOTE — NURSING NOTE
"Teledermatology Nurse Call for RETURN patients seen within the last 3 years:    The patient was contacted by phone and we reviewed, \"Due to the coronavirus pandemic, we are calling to review your visit and offer you a teledermatology visit where you send in photos via Therio. These photos will be seen by an MD or HERNAN. This will be billed to you and your insurance.\"  The patient was also told that \"a teledermatology visit is not as thorough as an in-person visit and that the quality of the photograph sent may not be of the same quality as that taken by the dermatology clinic, but the patient would like to proceed with an teledermatology because of National Emergency Regarding Coronavirus disease (COVID 19) Outbreak.\"  \"If a prescription is necessary we can send it directly to your pharmacy.  If lab work is needed we can place an order for that and you can then stop by our lab to have the test done at a later time.\"    The patient understood that they may receive a call from the clinic to review additional history, may still be instructed to come to clinic even after photo review and be billed for both visits with an MD. They were told that a photo assessment does not replace an in person skin exam. The patient understood that teledermatology is not for urgent issues and would require up to 3 business days for review. The patient denied skin pain, fever, mucosal symptoms (lesions, blisters, sores in the mouth, nose, eyes, or genitals)  IF PATIENT ENDORSES ANY OF THESE STOP AND PAGE  ON CALL ATTENDING. IF OTHER POSSIBLY URGENT SYMPTOMS THEN PAGE PHYSICIAN YOU ARE SCHEDULING WITH OR ON CALL IF NO ANSWER.       The patient chose to:                                                                                                                                                                                                                    Consent to a teledermatology visit with Fly6hart photos. The patient understood " they must upload a 1CloudStarhart photo for this visit to be completed. They indicated that the photo will be taken at their home address(if other address please document here). Patient told nursing these are already uploaded .  The patient was instructed to take photos of all all areas of concern and all areas of any rash from near and far away.                                                                                                                                                                                                                                                                                                                                                                                                                                      Patient concerns for this return visit:   Chief Complaint   Patient presents with     Derm Problem     Gluteal cleft erythema f/u.      Patient reported using Mupirocin before bed after showers daily. He voices improvement since last visit, but area have not resolved. The are no longer itches anymore, although still a little red. Mildly painful when wiping the area. Patient is still taking Sulfamethoxazole BID and did not use Vaseline petroleum jelly. Patient wanted to note that he has an allergy to Penicillins.     Nursing tasks completed  -Pharmacy preference was updated.  -The nurse has dropped in the AVS information *(For adults the phrase is umdermhteleavs and for pediatrics it is their own) for the physician to route in the AVS.                                                                                                                                                                                                                         -The patient was told to contact the clinic if they have not received correspondence within 72 hours.

## 2020-05-08 NOTE — TELEPHONE ENCOUNTER
Pt called, No answer.  does not identify pt. Left general message for pt to call the [facility] back at [phone]...Cheryl Rey RN, MD  [facility] Dermatology Adult [place]               Photos and phone    Virtual Visit for Derm Problem   5/8/2020   Cheryl Greer MD - [facility]  Visit Summary    Diagnosis     Dermatitis (Primary)    Orders Signed This Visit   (1)   hydrocortisone 2.5 % ointment     Orders Pended This Visit     None    Progress Notes     Cheryl Greer MD at 5/8/2020 10:45 AM     Status: Signed    Expand All Collapse All    [facility]  Store and Forward and [phone]        Impression and Recommendations (Patient Counseled on the Following):  1.Perianal strep, may have have dermatitis also, on mupirocin ointment, has about 1 week of bactrim, reviewed culture updates  -complete bactrim, complete mupriocin at same time  -after finishing antibiotics then start hydrocortisone twice daily for 2 weeks  Follow-up:   Follow-up with dermatology in approximately 15 days photos and phone. Earlier for new or changing lesions or rash.      Staff only:     Cheryl Greer MD    Department of Dermatology  [facility]: Phone: [phone], Fax:[phone]  [facility]: Phone: [phone], Fax: [phone]

## 2020-05-08 NOTE — PATIENT INSTRUCTIONS
[facility] Teledermatology Visit    Thank you for allowing us to participate in your care. Your findings, instructions and follow-up plan are as follows:  Perianal strep  Finish bactrim and mupirocin  After that,  start hydrocortisone twice daily for 2 weeks  When should I call my doctor?    If you are worsening or not improving, please, contact us or seek urgent care as noted below.     Who should I call with questions (adults)?    [facility] (adult and pediatric): [phone]     [facility] (adult): [phone]    For urgent needs outside of business hours call the [facility] at [phone] and ask for the dermatology resident on call    If this is a medical emergency and you are unable to reach an ER, Call [phone]      Who should I call with questions (pediatric)?  [facility]- Pediatric Dermatology  Dr. Viviana John, Dr. Maynor Lang, Dr. Brianne Madrid, Yanira Hunter, PA  Dr. Brigid Garcia, Dr. Gretchen Venegas & Dr. Garcia Lopez  Non Urgent  Nurse Triage Line; [phone]- Zara and Charu RN Care Coordinators   Stephanie (/Complex ) [phone]    If you need a prescription refill, please contact your pharmacy. Refills are approved or denied by our Physicians during normal business hours, Monday through Fridays  Per office policy, refills will not be granted if you have not been seen within the past year (or sooner depending on your child's condition)    Scheduling Information:  Pediatric Appointment Scheduling and Call Center [phone]  Radiology Scheduling- [phone]  Sedation Unit Scheduling- [phone]  [place] Scheduling- General [phone]; Pediatric Dermatology [phone]  Main  Services: [phone]  Dominican: [phone]  Argentine: [phone]  Hmong/Tuvaluan/[place]field: [phone]  Preadmission Nursing Department Fax Number: [phone] (Fax all pre-operative paperwork to this number)    For urgent matters arising during evenings, weekends, or holidays that cannot wait for normal business hours please call [phone] and ask for the Dermatology Resident On-Call to be paged.

## 2020-05-08 NOTE — PROGRESS NOTES
[facility]  Store and Forward and [phone]      Impression and Recommendations (Patient Counseled on the Following):  1.Perianal strep, may have have dermatitis also, on mupirocin ointment, has about 1 week of bactrim, reviewed culture updates  -complete bactrim, complete mupriocin at same time  -after finishing antibiotics then start hydrocortisone twice daily for 2 weeks  Follow-up:   Follow-up with dermatology in approximately 15 days photos and phone. Earlier for new or changing lesions or rash.      Staff only:    Cheryl Greer MD    Department of Dermatology  [facility]: Phone: [phone], Fax:[phone]  [facility] [place]: Phone: [phone], Fax: [phone]        _____________________________________________________________________________    Dermatology Problem List:  Gluteal cleft eruption  -e-cloi, strep A, Strep group B    Encounter Date: May 8, 2020    CC:   Chief Complaint   Patient presents with     Derm Problem     Gluteal cleft erythema f/u.        History of Present Illness:  I have reviewed the teledermatology information and the nursing intake corresponding to this issue. Chintan Niño is a 40 year old male who presents via teledermatology for rash. He was strep positive. Started on mupirocin and oral antibiotics. ITs a little better, does not itch and not as painful.  Has about 6 days of bactrim left.       ROS: Patient is generally feeling well today, no fevers or chills    Physical Examination:  General: Well-oudning  Skin: Focused examination within the teledermatology photograph(s) including buttocks was performed.   -gluteal cleft shiny primarily patchy erythema    Labs:  NA    Past Medical History:   Patient Active Problem List   Diagnosis     GERD (gastroesophageal reflux disease)     CARDIOVASCULAR SCREENING; LDL GOAL LESS THAN  160     Contusion of knee - Right     Contusion of elbow - Right     Attention deficit disorder     Adjustment disorder with mixed anxiety and depressed mood     Lateral epicondylitis, left elbow     Tinea cruris     Anal fissure     Tobacco abuse     Chronic migraine without aura without status migrainosus, not intractable     Past Medical History:   Diagnosis Date     GERD (gastroesophageal reflux disease) 1/20/2011     Past Surgical History:   Procedure Laterality Date     APPENDECTOMY OPEN CHILD       ENHANCE LASER REFRACTIVE BILATERAL EXISTING PT IN PARAMETERS         Social History:  Patient reports that he has quit smoking. He has quit using smokeless tobacco. He reports that he does not drink alcohol or use drugs.    Family History:  Family History   Problem Relation Age of Onset     Diabetes Mother      Retinal detachment Mother      Cancer Father         Bone cancer     Glaucoma Father      Cerebrovascular Disease Maternal Grandmother      Asthma No family hx of      C.A.D. No family hx of      Hypertension No family hx of      Breast Cancer No family hx of      Cancer - colorectal No family hx of      Prostate Cancer No family hx of      Macular Degeneration No family hx of        Medications:  Current Outpatient Medications   Medication     metoclopramide (REGLAN) 10 MG tablet     mupirocin (BACTROBAN) 2 % external ointment     penicillin V (VEETID) 500 MG tablet     sulfamethoxazole-trimethoprim (BACTRIM DS) 800-160 MG tablet     SUMAtriptan (IMITREX STATDOSE) 6 MG/0.5ML refill cartridge     SUMAtriptan (IMITREX) 20 MG/ACT nasal spray     venlafaxine (EFFEXOR-XR) 37.5 MG 24 hr capsule     No current facility-administered medications for this visit.           No Known Allergies      _____________________________________________________________________________    Teledermatology information:  - Location of patient: Home  - Patient presented as: return  - Location of teledermatologist:  ([facility] )  - Reason teledermatology is appropriate:  of National Emergency Regarding Coronavirus disease (COVID 19) Outbreak  - Image quality and interpretability: acceptable  - Physician has received verbal consent for a Video/Photos Visit from the patient? Yes  - In-person dermatology visit recommendation: no  - Date of images: 5/8/2020  - Service start time:11:23am  - Service end time:11:27am  - Date of report: 5/8/2020

## 2020-05-12 ENCOUNTER — VIRTUAL VISIT (OUTPATIENT)
Dept: FAMILY MEDICINE | Facility: CLINIC | Age: 40
End: 2020-05-12
Payer: COMMERCIAL

## 2020-05-12 DIAGNOSIS — H93.19 TINNITUS, UNSPECIFIED LATERALITY: ICD-10-CM

## 2020-05-12 DIAGNOSIS — G43.009 MIGRAINE WITHOUT AURA AND WITHOUT STATUS MIGRAINOSUS, NOT INTRACTABLE: Primary | ICD-10-CM

## 2020-05-12 DIAGNOSIS — N53.19 OTHER EJACULATORY DYSFUNCTION: ICD-10-CM

## 2020-05-12 PROCEDURE — 99214 OFFICE O/P EST MOD 30 MIN: CPT | Mod: TEL | Performed by: INTERNAL MEDICINE

## 2020-05-12 RX ORDER — SULFAMETHOXAZOLE/TRIMETHOPRIM 800-160 MG
TABLET ORAL
COMMUNITY
Start: 2020-05-04 | End: 2022-09-21

## 2020-05-12 NOTE — PROGRESS NOTES
"Chintan Niño is a 40 year old male who is being evaluated via a billable telephone visit.      The patient has been notified of following:     \"This telephone visit will be conducted via a call between you and your physician/provider. We have found that certain health care needs can be provided without the need for a physical exam.  This service lets us provide the care you need with a short phone conversation.  If a prescription is necessary we can send it directly to your pharmacy.  If lab work is needed we can place an order for that and you can then stop by our lab to have the test done at a later time.    Telephone visits are billed at different rates depending on your insurance coverage. During this emergency period, for some insurers they may be billed the same as an in-person visit.  Please reach out to your insurance provider with any questions.    If during the course of the call the physician/provider feels a telephone visit is not appropriate, you will not be charged for this service.\"    Patient has given verbal consent for Telephone visit?  Yes    What phone number would you like to be contacted at? [phone]    How would you like to obtain your AVS? MyChart    Subjective     Chintan Niño is a 40 year old male who presents to clinic today for the following health issues:    HPI     1) Migraine follow-up  Migraine prophylaxis medication, Venlafaxine ER is helping, with reduced frequency to 1 - 2 times per month. At the onset, patient was complaining of GI side effects once dose of Venlafaxine was up-titrated. Side effects resolved after dose of Venlafaxine ER was reduced to 37.5 mg once daily. He now complains of ejaculatory dysfunction (cannot achieve orgasm, leading to no ejaculation). Chintan claims that holding Venlafaxine ER will resolve it, but he cannot simply discontinue it due to reduced frequency of migraines.    2) Tinnitus  Mr. Niño is also complaining of ringing of both ears since " February 2020. It is not associated with hearing loss of dizziness. He saw an outside ENT and exam was normal. The patient was told that his tinnitus is due to migraine. He denies any daily exposure to loud noises, frequent ear infections during adulthood nor head trauma.      Patient Active Problem List   Diagnosis     GERD (gastroesophageal reflux disease)     CARDIOVASCULAR SCREENING; LDL GOAL LESS THAN 160     Contusion of knee - Right     Contusion of elbow - Right     Attention deficit disorder     Adjustment disorder with mixed anxiety and depressed mood     Lateral epicondylitis, left elbow     Tinea cruris     Anal fissure     Tobacco abuse     Migraine without aura and without status migrainosus, not intractable     Other ejaculatory dysfunction     Past Surgical History:   Procedure Laterality Date     APPENDECTOMY OPEN CHILD       ENHANCE LASER REFRACTIVE BILATERAL EXISTING PT IN PARAMETERS         Social History     Tobacco Use     Smoking status: Former Smoker     Smokeless tobacco: Former User   Substance Use Topics     Alcohol use: No     Family History   Problem Relation Age of Onset     Diabetes Mother      Retinal detachment Mother      Cancer Father         Bone cancer     Glaucoma Father      Cerebrovascular Disease Maternal Grandmother      Asthma No family hx of      C.A.D. No family hx of      Hypertension No family hx of      Breast Cancer No family hx of      Cancer - colorectal No family hx of      Prostate Cancer No family hx of      Macular Degeneration No family hx of          No Known Allergies  Recent Labs   Lab Test 11/06/19  1053 08/05/16  1428 07/02/14  0833   A1C 5.1  --   --    LDL  --   --  52   HDL  --   --  39*   TRIG  --   --  118   ALT  --  66  --    CR  --  1.23  --    GFRESTIMATED  --  66  --    GFRESTBLACK  --  80  --    POTASSIUM  --  3.9  --    TSH  --  2.56  --       BP Readings from Last 3 Encounters:   12/05/19 120/74   11/06/19 119/77   02/27/18 115/76    Wt  Readings from Last 3 Encounters:   04/30/20 88.5 kg (195 lb)   12/05/19 91.2 kg (201 lb)   11/06/19 88.1 kg (194 lb 3.2 oz)                    Reviewed and updated as needed this visit by Provider         Review of Systems   CONSTITUTIONAL: NEGATIVE for fever, chills, change in weight  INTEGUMENTARY/SKIN: NEGATIVE for worrisome rashes, moles or lesions  EYES: NEGATIVE for vision changes or irritation  ENT/MOUTH: NEGATIVE for ear pain , epistaxis, fever, hearing loss, hoarseness, nasal congestion, sinus pressure and sneezing  RESP: NEGATIVE for significant cough or SOB  CV: NEGATIVE for chest pain, palpitations or peripheral edema  GI: NEGATIVE for nausea, abdominal pain, heartburn, or change in bowel habits   male :negative for dysuria, hematuria, decreased urinary stream, erectile dysfunction  MUSCULOSKELETAL: NEGATIVE for significant arthralgias or myalgia  NEURO: NEGATIVE for HX head injury  , HX CVA, HX TIA and HX seizure D/O  ENDOCRINE: NEGATIVE for temperature intolerance, skin/hair changes  HEME: NEGATIVE for bleeding problems  PSYCHIATRIC: NEGATIVE for changes in mood or affect       Objective   Reported vitals:  There were no vitals taken for this visit.     Remainder of exam unable to be completed due to telephone visits    Diagnostic Test Results:  Labs reviewed in Epic        Assessment/Plan:  1. Migraine without aura and without status migrainosus, not intractable  Improving with Venlafaxine ER 37.5 mg/day as prophylactic drug. Responsive to Sumatriptan for rescue treatment.    2. Other ejaculatory dysfunction  Most likely drug-induced secondary to Venlafaxine ER.  Responsive once Venlafaxine ER is held. Patient advised to use  Venlafaxine ER every other day in order to improve his genitourinary concern.    3. Tinnitus, unspecified laterality  Normal ENT exam per patient. No alarming neurological symptoms, other than chronic migraines. MR of brain last 6/22/2015 is normal. Not associated with any  "prescription drugs at the onset of symptoms (\"it's always there\"). Patient reassured. However, if dizziness and hearing loss occurs, then patient should re-consult ENT.    Follow-up in 6 months.    Phone call duration:  10 minutes  Start: 10:00 AM  End: 10:10 AM    Shyam Gee MD        "

## 2020-08-18 ENCOUNTER — MYC REFILL (OUTPATIENT)
Dept: FAMILY MEDICINE | Facility: CLINIC | Age: 40
End: 2020-08-18

## 2020-08-18 DIAGNOSIS — G43.709 CHRONIC MIGRAINE WITHOUT AURA WITHOUT STATUS MIGRAINOSUS, NOT INTRACTABLE: ICD-10-CM

## 2020-08-18 RX ORDER — SUMATRIPTAN 20 MG/1
1 SPRAY NASAL PRN
Qty: 1 EACH | Refills: 3 | Status: CANCELLED | OUTPATIENT
Start: 2020-08-18

## 2020-08-20 NOTE — TELEPHONE ENCOUNTER
Medication removed as it was filled by another provider.    Ashley Faria RN, [facility] Triage

## 2020-10-13 ENCOUNTER — VIRTUAL VISIT (OUTPATIENT)
Dept: FAMILY MEDICINE | Facility: OTHER | Age: 40
End: 2020-10-13
Payer: COMMERCIAL

## 2020-10-13 PROCEDURE — 99421 OL DIG E/M SVC 5-10 MIN: CPT | Performed by: EMERGENCY MEDICINE

## 2020-10-13 NOTE — PROGRESS NOTES
"Date: 10/13/2020 08:20:24  Clinician: Nikhil Hoffmann  Clinician NPI: [id number]  Patient: Chintan Niño  Patient : 1980  Patient Address: [address] [place] [address]  Patient Phone: [phone]  Visit Protocol: URI  Patient Summary:  Chintan is a 40 year old ( : 1980 ) male who initiated a [facility] Visit for COVID-19 (Coronavirus) evaluation and screening. When asked the question \"Please sign me up to receive news, health information and promotions from [facility].\", Chintan responded \"No\".    Chintan states his symptoms started gradually 3-4 days ago.   His symptoms consist of a headache, a cough, nausea, myalgia, malaise, and a sore throat.   Symptom details     Cough: Chintan coughs a few times an hour and his cough is not more bothersome at night. Phlegm does not come into his throat when he coughs. He does not believe his cough is caused by post-nasal drip.     Sore throat: Chintan reports having moderate throat pain (4-6 on a 10 point pain scale), does not have exudate on his tonsils, and can swallow liquids. He is not sure if the lymph nodes in his neck are enlarged. A rash has not appeared on the skin since the sore throat started.     Headache: He states the headache is severe (7-9 on a 10 point pain scale).      Chintan denies having ear pain, wheezing, fever, nasal congestion, anosmia, vomiting, rhinitis, facial pain or pressure, chills, teeth pain, ageusia, and diarrhea. He also denies double sickening (worsening symptoms after initial improvement), taking antibiotic medication in the past month, and having recent facial or sinus surgery in the past 60 days. He is not experiencing dyspnea.   Precipitating events  Within the past week, Chintan has not been exposed to someone with strep throat. He has recently been exposed to someone with influenza. Chintan has been in close contact with the following high risk individuals: adults 65 or older.   Pertinent COVID-19 (Coronavirus) " information  In the past 14 days, Chintan has not worked in a congregate living setting.   He does not work or volunteer as healthcare worker or a  and does not work or volunteer in a healthcare facility.   Chintan also has not lived in a congregate living setting in the past 14 days. He lives with a healthcare worker.   Chintan has had a close contact with a laboratory-confirmed COVID-19 patient within 14 days of symptom onset. Additional information about contact with COVID-19 (Coronavirus) patient as reported by the patient (free text): [place]   Since December 2019, Chintan and has not had upper respiratory infection or influenza-like illness. Has not been diagnosed with lab-confirmed COVID-19 test   Pertinent medical history  Chintan does not need a return to work/school note.   Weight: 195 lbs   Chintan smokes or uses smokeless tobacco.   Weight: 195 lbs    MEDICATIONS: sumatriptan-naproxen oral, ALLERGIES: NKDA  Clinician Response:  Dear Chintan,   Your symptoms show that you may have coronavirus (COVID-19). This illness can cause fever, cough and trouble breathing. Many people get a mild case and get better on their own. Some people can get very sick.  What should I do?  We would like to test you for this virus.   1. Please call [phone] to schedule your visit. Explain that you were referred by [facility] to have a COVID-19 test. Be ready to share your [facility] visit ID number.  The following will serve as your written order for this COVID Test, ordered by me, for the indication of suspected COVID [Z20.828]: The test will be ordered in [employer], our electronic health record, after you are scheduled. It will show as ordered and authorized by Anthony Mckenzie MD.  Order: COVID-19 (Coronavirus) PCR for SYMPTOMATIC testing from [facility].      2. When it's time for your COVID test:  Stay at least 6 feet away from others. (If someone will drive you to your test, stay in the backseat, as far away from the  " as you can.)   Cover your mouth and nose with a mask, tissue or washcloth.  Go straight to the testing site. Don't make any stops on the way there or back.      3.Starting now: Stay home and away from others (self-isolate) until:   You've had no fever---and no medicine that reduces fever---for one full day (24 hours). And...   Your other symptoms have gotten better. For example, your cough or breathing has improved. And...   At least 10 days have passed since your symptoms started.       During this time, don't leave the house except for testing or medical care.   Stay in your own room, even for meals. Use your own bathroom if you can.   Stay away from others in your home. No hugging, kissing or shaking hands. No visitors.  Don't go to work, school or anywhere else.    Clean \"high touch\" surfaces often (doorknobs, counters, handles, etc.). Use a household cleaning spray or wipes. You'll find a full list of  on the EPA website: www.epa.gov/pesticide-registration/list-n-disinfectants-use-against-sars-cov-2.   Cover your mouth and nose with a mask, tissue or washcloth to avoid spreading germs.  Wash your hands and face often. Use soap and water.  Caregivers in these groups are at risk for severe illness due to COVID-19:  o People 65 years and older  o People who live in a nursing home or long-term care facility  o People with chronic disease (lung, heart, cancer, diabetes, kidney, liver, immunologic)  o People who have a weakened immune system, including those who:   Are in cancer treatment  Take medicine that weakens the immune system, such as corticosteroids  Had a bone marrow or organ transplant  Have an immune deficiency  Have poorly controlled HIV or AIDS  Are obese (body mass index of 40 or higher)  Smoke regularly   o Caregivers should wear gloves while washing dishes, handling laundry and cleaning bedrooms and bathrooms.  o Use caution when washing and drying laundry: Don't shake dirty laundry, " and use the warmest water setting that you can.  o For more tips, go to www.cdc.gov/coronavirus/2019-ncov/downloads/10Things.pdf.    4.Sign up for [employer]. We know it's scary to hear that you might have COVID-19. We want to track your symptoms to make sure you're okay over the next 2 weeks. Please look for an email from [employer]---this is a free, online program that we'll use to keep in touch. To sign up, follow the link in the email. Learn more at [email or web address]  How can I take care of myself?   Get lots of rest. Drink extra fluids (unless a doctor has told you not to).   Take Tylenol (acetaminophen) for fever or pain. If you have liver or kidney problems, ask your family doctor if it's okay to take Tylenol.   Adults can take either:    650 mg (two 325 mg pills) every 4 to 6 hours, or...   1,000 mg (two 500 mg pills) every 8 hours as needed.    Note: Don't take more than 3,000 mg in one day. Acetaminophen is found in many medicines (both prescribed and over-the-counter medicines). Read all labels to be sure you don't take too much.   For children, check the Tylenol bottle for the right dose. The dose is based on the child's age or weight.    If you have other health problems (like cancer, heart failure, an organ transplant or severe kidney disease): Call your specialty clinic if you don't feel better in the next 2 days.       Know when to call 911. Emergency warning signs include:    Trouble breathing or shortness of breath Pain or pressure in the chest that doesn't go away Feeling confused like you haven't felt before, or not being able to wake up Bluish-colored lips or face.  Where can I get more information?    [employer] -- About COVID-19: [email or web address]   CDC -- What to Do If You're Sick: www.cdc.gov/coronavirus/2019-ncov/about/steps-when-sick.html   CDC -- Ending Home Isolation: www.cdc.gov/coronavirus/2019-ncov/hcp/disposition-in-home-patients.html   CDC --  Caring for Someone: www.cdc.gov/coronavirus/2019-ncov/if-you-are-sick/care-for-someone.html   [facility] -- Interim Guidance for Hospital Discharge to Home: www.health.[facility].[place]/diseases/coronavirus/hcp/hospdischarge.pdf   [facility] clinical trials (COVID-19 research studies): clinicalaffairs.[facility]-clinical-trials    Below are the COVID-19 hotlines at the [place] [facility] ([facility]). Interpreters are available.    For health questions: Call 010-196-8622 or 1-125.187.6346 (7 a.m. to 7 p.m.) For questions about schools and childcare: Call 917-493-7836 or 1-884.388.3591 (7 a.m. to 7 p.m.)    Diagnosis: Cough  Diagnosis ICD: R05

## 2020-10-22 ENCOUNTER — MYC REFILL (OUTPATIENT)
Dept: FAMILY MEDICINE | Facility: CLINIC | Age: 40
End: 2020-10-22

## 2020-10-22 DIAGNOSIS — G43.709 CHRONIC MIGRAINE WITHOUT AURA WITHOUT STATUS MIGRAINOSUS, NOT INTRACTABLE: ICD-10-CM

## 2020-10-22 NOTE — TELEPHONE ENCOUNTER
Routing refill request to provider for review/approval because:  Serotonin Agonists Failed  Due for annual exam    Last Written Prescription Date:  8/18/20  Last Fill Quantity: 1 bottle,   refills: 3     Pharmacy states patient filled on the following dates:  8/19, 8/30, 9/16 & 10/7. A bottle contains 6 sprays and will last about 15 days.     Last office visit 2/8/18 with prescribing provider:  Jairo Hung PA-C     Pended for 1 bottle with appointment reminder.     Future Office Visit:  None    Kayla Stewart RN BSN

## 2020-10-23 RX ORDER — SUMATRIPTAN 20 MG/1
1 SPRAY NASAL PRN
Qty: 6 EACH | Refills: 0 | Status: SHIPPED | OUTPATIENT
Start: 2020-10-23 | End: 2020-10-28

## 2020-10-27 ASSESSMENT — ENCOUNTER SYMPTOMS
DIARRHEA: 0
JOINT SWELLING: 0
SORE THROAT: 0
WEAKNESS: 0
MYALGIAS: 0
PALPITATIONS: 0
HEADACHES: 0
EYE PAIN: 0
HEMATOCHEZIA: 0
FEVER: 0
DIZZINESS: 0
ARTHRALGIAS: 0
DYSURIA: 0
HEARTBURN: 0
ABDOMINAL PAIN: 0
SHORTNESS OF BREATH: 0
FREQUENCY: 0
PARESTHESIAS: 0
COUGH: 0
NAUSEA: 0
CHILLS: 0
NERVOUS/ANXIOUS: 0
HEMATURIA: 0
CONSTIPATION: 0

## 2020-10-28 ENCOUNTER — OFFICE VISIT (OUTPATIENT)
Dept: FAMILY MEDICINE | Facility: CLINIC | Age: 40
End: 2020-10-28
Payer: COMMERCIAL

## 2020-10-28 VITALS
WEIGHT: 206 LBS | RESPIRATION RATE: 16 BRPM | HEIGHT: 67 IN | HEART RATE: 74 BPM | OXYGEN SATURATION: 97 % | TEMPERATURE: 97.4 F | SYSTOLIC BLOOD PRESSURE: 124 MMHG | DIASTOLIC BLOOD PRESSURE: 84 MMHG | BODY MASS INDEX: 32.33 KG/M2

## 2020-10-28 DIAGNOSIS — E66.1 CLASS 1 DRUG-INDUCED OBESITY WITHOUT SERIOUS COMORBIDITY WITH BODY MASS INDEX (BMI) OF 32.0 TO 32.9 IN ADULT: ICD-10-CM

## 2020-10-28 DIAGNOSIS — E66.811 CLASS 1 DRUG-INDUCED OBESITY WITHOUT SERIOUS COMORBIDITY WITH BODY MASS INDEX (BMI) OF 32.0 TO 32.9 IN ADULT: ICD-10-CM

## 2020-10-28 DIAGNOSIS — G43.709 CHRONIC MIGRAINE WITHOUT AURA WITHOUT STATUS MIGRAINOSUS, NOT INTRACTABLE: ICD-10-CM

## 2020-10-28 DIAGNOSIS — Z00.00 ROUTINE GENERAL MEDICAL EXAMINATION AT A HEALTH CARE FACILITY: Primary | ICD-10-CM

## 2020-10-28 DIAGNOSIS — Z23 NEED FOR PROPHYLACTIC VACCINATION AND INOCULATION AGAINST INFLUENZA: ICD-10-CM

## 2020-10-28 DIAGNOSIS — Z13.6 CARDIOVASCULAR SCREENING; LDL GOAL LESS THAN 160: ICD-10-CM

## 2020-10-28 LAB
ALBUMIN SERPL-MCNC: 3.8 G/DL (ref 3.4–5)
ALP SERPL-CCNC: 70 U/L (ref 40–150)
ALT SERPL W P-5'-P-CCNC: 43 U/L (ref 0–70)
ANION GAP SERPL CALCULATED.3IONS-SCNC: 4 MMOL/L (ref 3–14)
AST SERPL W P-5'-P-CCNC: 27 U/L (ref 0–45)
BILIRUB SERPL-MCNC: 0.6 MG/DL (ref 0.2–1.3)
BUN SERPL-MCNC: 12 MG/DL (ref 7–30)
CALCIUM SERPL-MCNC: 8.8 MG/DL (ref 8.5–10.1)
CHLORIDE SERPL-SCNC: 107 MMOL/L (ref 94–109)
CHOLEST SERPL-MCNC: 122 MG/DL
CO2 SERPL-SCNC: 27 MMOL/L (ref 20–32)
CREAT SERPL-MCNC: 1 MG/DL (ref 0.66–1.25)
GFR SERPL CREATININE-BSD FRML MDRD: >90 ML/MIN/{1.73_M2}
GLUCOSE SERPL-MCNC: 90 MG/DL (ref 70–99)
HDLC SERPL-MCNC: 45 MG/DL
LDLC SERPL CALC-MCNC: 54 MG/DL
NONHDLC SERPL-MCNC: 77 MG/DL
POTASSIUM SERPL-SCNC: 4.2 MMOL/L (ref 3.4–5.3)
PROT SERPL-MCNC: 7.5 G/DL (ref 6.8–8.8)
SODIUM SERPL-SCNC: 138 MMOL/L (ref 133–144)
TRIGL SERPL-MCNC: 113 MG/DL

## 2020-10-28 PROCEDURE — 36415 COLL VENOUS BLD VENIPUNCTURE: CPT | Performed by: INTERNAL MEDICINE

## 2020-10-28 PROCEDURE — 90686 IIV4 VACC NO PRSV 0.5 ML IM: CPT | Performed by: INTERNAL MEDICINE

## 2020-10-28 PROCEDURE — 80053 COMPREHEN METABOLIC PANEL: CPT | Performed by: INTERNAL MEDICINE

## 2020-10-28 PROCEDURE — 80061 LIPID PANEL: CPT | Performed by: INTERNAL MEDICINE

## 2020-10-28 PROCEDURE — 99396 PREV VISIT EST AGE 40-64: CPT | Mod: 25 | Performed by: INTERNAL MEDICINE

## 2020-10-28 PROCEDURE — 90471 IMMUNIZATION ADMIN: CPT | Performed by: INTERNAL MEDICINE

## 2020-10-28 RX ORDER — SUMATRIPTAN 20 MG/1
1 SPRAY NASAL PRN
Qty: 6 EACH | Refills: 11 | Status: SHIPPED | OUTPATIENT
Start: 2020-10-28 | End: 2021-12-01

## 2020-10-28 ASSESSMENT — ENCOUNTER SYMPTOMS
CHILLS: 0
NERVOUS/ANXIOUS: 0
NAUSEA: 0
HEMATURIA: 0
SORE THROAT: 0
PALPITATIONS: 0
MYALGIAS: 0
HEADACHES: 0
WEAKNESS: 0
FREQUENCY: 0
PARESTHESIAS: 0
CONSTIPATION: 0
DIARRHEA: 0
DIZZINESS: 0
ABDOMINAL PAIN: 0
EYE PAIN: 0
HEARTBURN: 0
FEVER: 0
COUGH: 0
HEMATOCHEZIA: 0
SHORTNESS OF BREATH: 0
ARTHRALGIAS: 0
JOINT SWELLING: 0
DYSURIA: 0

## 2020-10-28 ASSESSMENT — PAIN SCALES - GENERAL: PAINLEVEL: NO PAIN (0)

## 2020-10-28 ASSESSMENT — MIFFLIN-ST. JEOR: SCORE: 1793.04

## 2020-10-28 NOTE — PATIENT INSTRUCTIONS
Preventive Health Recommendations  Male Ages 40 to 49    Yearly exam:             See your health care provider every year in order to  o   Review health changes.   o   Discuss preventive care.    o   Review your medicines if your doctor has prescribed any.    You should be tested each year for STDs (sexually transmitted diseases) if you re at risk.     Have a cholesterol test every 5 years.     Have a colonoscopy (test for colon cancer) if someone in your family has had colon cancer or polyps before age 50.     After age 45, have a diabetes test (fasting glucose). If you are at risk for diabetes, you should have this test every 3 years.      Talk with your health care provider about whether or not a prostate cancer screening test (PSA) is right for you.    Shots: Get a flu shot each year. Get a tetanus shot every 10 years.     Nutrition:    Eat at least 5 servings of fruits and vegetables daily.     Eat whole-grain bread, whole-wheat pasta and brown rice instead of white grains and rice.     Get adequate Calcium and Vitamin D.     Lifestyle    Exercise for at least 150 minutes a week (30 minutes a day, 5 days a week). This will help you control your weight and prevent disease.     Limit alcohol to one drink per day.     No smoking.     Wear sunscreen to prevent skin cancer.     See your dentist every six months for an exam and cleaning.    At [facility], we strive to deliver an exceptional experience to you, every time we see you. If you receive a survey, please complete it as we do value your feedback.  If you have [employer], you can expect to receive results automatically within 24 hours of their completion.  Your provider will send a note interpreting your results as well.   If you do not have MyChart, you should receive your results in about a week by mail.    Your care team:                            Family Medicine Internal Medicine   MD Shyam Sanchez MD    MD Juan Flannery MD Katya Georgiev PAPumaC  Lita Tracey, APRN CNP    Chapo Silva, MD Pediatrics   Ronald Naidu, PAPumaC  Eliane Villanueva, CNP MD Camila Mary APRN CNP   MD Cinthia Lomas MD Deborah Mielke, MD Radha Harding, APRN CNP  Sarita Diggs, PAMEGAN Monsivais, CNP  MD Imani Fu MD Angela Wermerskirchen, MD      Clinic hours: Monday - Thursday 7 am-7 pm; Fridays 7 am-5 pm.   Urgent care: Monday - Friday 11 am-9 pm; Saturday and Sunday 9 am-5 pm.    Clinic: [phone]       [facility]: Monday - Thursday 8 am - 7 pm; Friday 8 am - 6 pm  [facility]: [phone]     Use www.[email or web address] for 24/7 diagnosis and treatment of dozens of conditions.

## 2020-10-28 NOTE — PROGRESS NOTES
SUBJECTIVE:   CC: Chintan Niño is an 40 year old male who presents for preventative health visit.     Patient has been advised of split billing requirements and indicates understanding: Yes  Healthy Habits:     Getting at least 3 servings of Calcium per day:  Yes    Bi-annual eye exam:  Yes    Dental care twice a year:  NO    Sleep apnea or symptoms of sleep apnea:  None    Diet:  Regular (no restrictions)    Frequency of exercise:  2-3 days/week    Duration of exercise:  N/A    Taking medications regularly:  No    Barriers to taking medications:  Side effects    Medication side effects:  Not applicable    PHQ-2 Total Score: 0    Additional concerns today:  No        Neurologist from [facility] recommended PHYSICAL THERAPY.    Migraine headache- receives Sumatriptan nasal that last only 2 weeks. Sometimes, he has to take Sumatriptan twice a day. Normal EKG last 2016.    Irregular rhythm last 2/27/2020 that led to echo on 3/3/2020.  Stubbed 5th digit, left, and played hockey without pain  Tested negative for COVID two weeks (negative) from someone who was symptomatic after 10 days.      Today's PHQ-2 Score:   PHQ-2 ( 1999 Pfizer) 10/27/2020   Q1: Little interest or pleasure in doing things 0   Q2: Feeling down, depressed or hopeless 0   PHQ-2 Score 0   Q1: Little interest or pleasure in doing things Not at all   Q2: Feeling down, depressed or hopeless Not at all   PHQ-2 Score 0       Abuse: Current or Past(Physical, Sexual or Emotional)- No  Do you feel safe in your environment? Yes        Social History     Tobacco Use     Smoking status: Former Smoker     Smokeless tobacco: Former User   Substance Use Topics     Alcohol use: No     If you drink alcohol do you typically have >3 drinks per day or >7 drinks per week? No    Alcohol Use 10/27/2020   Prescreen: >3 drinks/day or >7 drinks/week? No   Prescreen: >3 drinks/day or >7 drinks/week? -   No flowsheet data found.    Last PSA: No results found for: PSA    Reviewed  orders with patient. Reviewed health maintenance and updated orders accordingly - Yes  Labs reviewed in EPIC  BP Readings from Last 3 Encounters:   10/28/20 124/84   12/05/19 120/74   11/06/19 119/77    Wt Readings from Last 3 Encounters:   10/28/20 93.4 kg (206 lb)   04/30/20 88.5 kg (195 lb)   12/05/19 91.2 kg (201 lb)                  Patient Active Problem List   Diagnosis     GERD (gastroesophageal reflux disease)     CARDIOVASCULAR SCREENING; LDL GOAL LESS THAN 160     Contusion of knee - Right     Contusion of elbow - Right     Attention deficit disorder     Adjustment disorder with mixed anxiety and depressed mood     Lateral epicondylitis, left elbow     Tinea cruris     Anal fissure     Tobacco abuse     Migraine without aura and without status migrainosus, not intractable     Other ejaculatory dysfunction     Class 1 drug-induced obesity without serious comorbidity with body mass index (BMI) 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 No family hx of      Hypertension No family hx of      Breast Cancer No family hx of      Cancer - colorectal No family hx of      Prostate Cancer No family hx of      Macular Degeneration No family hx of          No Known Allergies  Recent Labs   Lab Test 10/28/20  0902 11/06/19  1053 08/05/16  1428 07/02/14  0833   A1C  --  5.1  --   --    LDL 54  --   --  52   HDL 45  --   --  39*   TRIG 113  --   --  118   ALT 43  --  66  --    CR 1.00  --  1.23   "--    GFRESTIMATED >90  --  66  --    GFRESTBLACK >90  --  80  --    POTASSIUM 4.2  --  3.9  --    TSH  --   --  2.56  --         Reviewed and updated as needed this visit by clinical staff                 Reviewed and updated as needed this visit by Provider                    Review of Systems   Constitutional: Negative for chills and fever.   HENT: Negative for congestion, ear pain, hearing loss and sore throat.    Eyes: Negative for pain and visual disturbance.   Respiratory: Negative for cough and shortness of breath.    Cardiovascular: Negative for chest pain, palpitations and peripheral edema.   Gastrointestinal: Negative for abdominal pain, constipation, diarrhea, heartburn, hematochezia and nausea.   Genitourinary: Negative for discharge, dysuria, frequency, genital sores, hematuria, impotence and urgency.   Musculoskeletal: Negative for arthralgias, joint swelling and myalgias.   Skin: Negative for rash.   Neurological: Negative for dizziness, weakness, headaches and paresthesias.   Psychiatric/Behavioral: Negative for mood changes. The patient is not nervous/anxious.        OBJECTIVE:   /84 (BP Location: Right arm, Patient Position: Chair, Cuff Size: Adult Regular)   Pulse 74   Temp 97.4  F (36.3  C) (Tympanic)   Resp 16   Ht 1.702 m (5' 7\")   Wt 93.4 kg (206 lb)   SpO2 97%   BMI 32.26 kg/m      Physical Exam  GENERAL: healthy, alert and no distress  EYES: Eyes grossly normal to inspection, PERRL and conjunctivae and sclerae normal  HENT: ear canals and TM's normal, nose and mouth without ulcers or lesions  NECK: no adenopathy, no asymmetry, masses, or scars and thyroid normal to palpation  RESP: lungs clear to auscultation - no rales, rhonchi or wheezes  CV: regular rate and rhythm, normal S1 S2, no S3 or S4, no murmur, click or rub, no peripheral edema and peripheral pulses strong  ABDOMEN: soft, nontender, no hepatosplenomegaly, no masses and bowel sounds normal  MS: no gross " musculoskeletal defects noted, no edema  SKIN: no suspicious lesions or rashes  NEURO: Normal strength and tone, mentation intact and speech normal  PSYCH: mentation appears normal, affect normal/bright    Diagnostic Test Results:                                                 Transthoracic Echo Report   OLGA GONZALEZ ID: [id number] Age: 39 : 1980 Ordering Provider: KEVIN ALBRIGHT   Exam Date: 2020 08:14 Gender: M Sonographer: EEV   Accession #: [id number] Height: 67 in BSA: 2.04 m  BP: 108 / 62   Weight: 205 lbs BMI: 32.1 kg/m          Site: [facility]   Location: Outpatient Rhythm: Normal Sinus Rhythm   Procedure Components: 2D imaging, Color Doppler, Spectral Doppler   Indications: Palpitation   Technical Quality: Good Contrast: None     Final Conclusion   Visually Estimated EF: 55-60%   Normal LV size and systolic function with estimated ejection fraction of 55-60%.   No regional wall motion abnormalities noted, although image quality precludes the precise   evaluation of regional wall motion.   No significant valvular heart disease noted.   Normal RV size and systolic function.    ASSESSMENT/PLAN:   1. Routine general medical examination at a health care facility    - Lipid panel reflex to direct LDL Fasting  - Comprehensive metabolic panel (BMP + Alb, Alk Phos, ALT, AST, Total. Bili, TP)    2. Chronic migraine without aura without status migrainosus, not intractable    - SUMAtriptan (IMITREX) 20 MG/ACT nasal spray; Spray 1 spray in nostril as needed for migraine May repeat in 2 hours. Max 2 sprays/24 hours.  Dispense: 6 each; Refill: 11  - NEUROLOGY ADULT REFERRAL    3. Class 1 drug-induced obesity without serious comorbidity with body mass index (BMI) of 32.0 to 32.9 in adult    - Comprehensive metabolic panel (BMP + Alb, Alk Phos, ALT, AST, Total. Bili, TP)    4. CARDIOVASCULAR SCREENING; LDL GOAL LESS THAN 160    - Lipid panel reflex to direct LDL  "Fasting    5. Need for prophylactic vaccination and inoculation against influenza    - DE VACCINE ADMINISTRATION, INITIAL  - INFLUENZA VACCINE IM > 6 MONTHS VALENT IIV4 [78722]    Patient has been advised of split billing requirements and indicates understanding: Yes  COUNSELING:   Special attention given to:        Regular exercise       Healthy diet/nutrition       Immunizations    Vaccinated for: Influenza             The ASCVD Risk score (Alexis MCCRAY Jr., et al., 2013) failed to calculate for the following reasons:    The valid total cholesterol range is 130 to 320 mg/dL    Estimated body mass index is 30.54 kg/m  as calculated from the following:    Height as of 12/5/19: 1.702 m (5' 7\").    Weight as of 4/30/20: 88.5 kg (195 lb).     Weight management plan: diet and exercise.    He reports that he has quit smoking. He has quit using smokeless tobacco.      Counseling Resources:  ATP IV Guidelines  Pooled Cohorts Equation Calculator  FRAX Risk Assessment  ICSI Preventive Guidelines  Dietary Guidelines for Americans, 2010  USDA's MyPlate  ASA Prophylaxis  Lung CA Screening    Shyam Gee MD  [facility]  "

## 2020-11-02 ENCOUNTER — DOCUMENTATION ONLY (OUTPATIENT)
Dept: CARE COORDINATION | Facility: CLINIC | Age: 40
End: 2020-11-02

## 2020-11-16 ENCOUNTER — PRE VISIT (OUTPATIENT)
Dept: NEUROLOGY | Facility: CLINIC | Age: 40
End: 2020-11-16

## 2020-11-16 NOTE — TELEPHONE ENCOUNTER
FUTURE VISIT INFORMATION      FUTURE VISIT INFORMATION:    Date: 11/18/2020    Time: 930am    [place] [facility]  REFERRAL INFORMATION:    Referring provider:  Dr. Gee    Referring providers clinic:  [facility]    Reason for visit/diagnosis  Migraines     RECORDS REQUESTED FROM:       Clinic name Comments Records Status Imaging Status   Internal Dr. Gee-10/28/2020, 5/12/2020, 12/5/2019    MR Brain 6/22/2015 Epic PACS

## 2020-11-18 ENCOUNTER — TELEPHONE (OUTPATIENT)
Dept: NEUROLOGY | Facility: CLINIC | Age: 40
End: 2020-11-18

## 2020-11-18 ENCOUNTER — VIRTUAL VISIT (OUTPATIENT)
Dept: NEUROLOGY | Facility: CLINIC | Age: 40
End: 2020-11-18
Attending: INTERNAL MEDICINE
Payer: COMMERCIAL

## 2020-11-18 DIAGNOSIS — G43.709 CHRONIC MIGRAINE WITHOUT AURA WITHOUT STATUS MIGRAINOSUS, NOT INTRACTABLE: Primary | ICD-10-CM

## 2020-11-18 PROCEDURE — 99203 OFFICE O/P NEW LOW 30 MIN: CPT | Mod: GT | Performed by: PSYCHIATRY & NEUROLOGY

## 2020-11-18 RX ORDER — ONDANSETRON 4 MG/1
4 TABLET, FILM COATED ORAL EVERY 8 HOURS PRN
Qty: 20 TABLET | Refills: 11 | Status: SHIPPED | OUTPATIENT
Start: 2020-11-18 | End: 2022-12-07

## 2020-11-18 RX ORDER — NAPROXEN 500 MG/1
500 TABLET ORAL 2 TIMES DAILY PRN
Qty: 28 TABLET | Refills: 11 | Status: SHIPPED | OUTPATIENT
Start: 2020-11-18 | End: 2022-12-07
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
## 2020-11-18 NOTE — TELEPHONE ENCOUNTER
Prior Authorization Retail Medication Request    Medication/Dose: galcanezumab-gnlm (EMGALITY) 120 MG/ML injection; Inject 1 mL (120 mg) Subcutaneous every 28 days - Subcutaneous; Inject 2 mLs (240 mg) Subcutaneous once for 1 dose - Subcutaneous  ICD code (if different than what is on RX):  G43.709  Previously Tried and Failed:  He previously took venlafaxine, amitriptyline, bupropion, topiramate,  Rationale:  Chronic migraine 20-30 headache days a month.     Insurance Name:  [employer]  Insurance ID:  [id number]       Pharmacy Information (if different than what is on RX)  Name:    Phone:
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
## 2020-11-19 NOTE — TELEPHONE ENCOUNTER
PA Initiation    Medication: galcanezumab-gnlm (EMGALITY) 120 MG/ML injection; Inject 1 mL (120 mg) Subcutaneous every 28 days - Subcutaneous; Inject 2 mLs (240 mg) Subcutaneous once for 1 dose - Subcutaneous  Insurance Company: [employer] - Phone [phone] Fax [phone]  Pharmacy Filling the Rx: [facility] [id number] [place] [address] [address] AT Pending sale to [facility] [address]  Filling Pharmacy Phone:    Filling Pharmacy Fax:    Start Date: 11/19/2020    Central Prior Authorization Team   Phone: [phone]        Awaiting additional questions via CMM

## 2020-11-24 NOTE — TELEPHONE ENCOUNTER
Prior Authorization Approval    Authorization Effective Date: 10/19/2020  Authorization Expiration Date: 5/19/2021  Medication: galcanezumab-gnlm (EMGALITY) 120 MG/ML injection; Inject 1 mL (120 mg) Subcutaneous every 28 days - Subcutaneous; Inject 2 mLs (240 mg) Subcutaneous once for 1 dose - Subcutaneous  Approved Dose/Quantity: 1 time loading dose approved, then 1/month  Reference #: [id number]   Insurance Company: [employer] - Phone [phone] Fax [phone]  Which Pharmacy is filling the prescription (Not needed for infusion/clinic administered): [id number] - [place] [phone] AT [facility] [address]  Pharmacy Notified:  Yes  Patient Notified:  **Instructed pharmacy to notify patient when script is ready to /ship.**

## 2020-12-01 NOTE — TELEPHONE ENCOUNTER
Called [facility] at [phone] to see if something else is needed as pharmacy is still getting a rejection, but per call the rep stated the pharmacy corrected the error on their end and now the pharmacy is getting a paid claim.

## 2020-12-01 NOTE — TELEPHONE ENCOUNTER
[facility] called to inform us that a PA is needed for this medication. I informed them that we did a prior auth and that it was approved. They are requesting that we follow up with the insurance to find out why the medication isn't going through and give them a follow up call at [phone].

## 2020-12-28 ENCOUNTER — ANCILLARY PROCEDURE (OUTPATIENT)
Dept: GENERAL RADIOLOGY | Facility: CLINIC | Age: 40
End: 2020-12-28
Attending: PEDIATRICS
Payer: COMMERCIAL

## 2020-12-28 ENCOUNTER — OFFICE VISIT (OUTPATIENT)
Dept: ORTHOPEDICS | Facility: CLINIC | Age: 40
End: 2020-12-28
Payer: COMMERCIAL

## 2020-12-28 VITALS
WEIGHT: 200 LBS | HEIGHT: 67 IN | BODY MASS INDEX: 31.39 KG/M2 | DIASTOLIC BLOOD PRESSURE: 84 MMHG | SYSTOLIC BLOOD PRESSURE: 132 MMHG

## 2020-12-28 DIAGNOSIS — M25.551 RIGHT HIP PAIN: ICD-10-CM

## 2020-12-28 DIAGNOSIS — M25.551 RIGHT HIP PAIN: Primary | ICD-10-CM

## 2020-12-28 DIAGNOSIS — R10.31 ABDOMINAL WALL PAIN IN RIGHT LOWER QUADRANT: ICD-10-CM

## 2020-12-28 PROCEDURE — 99204 OFFICE O/P NEW MOD 45 MIN: CPT | Performed by: PEDIATRICS

## 2020-12-28 PROCEDURE — 72100 X-RAY EXAM L-S SPINE 2/3 VWS: CPT | Performed by: RADIOLOGY

## 2020-12-28 PROCEDURE — 73502 X-RAY EXAM HIP UNI 2-3 VIEWS: CPT | Mod: RT | Performed by: RADIOLOGY

## 2020-12-28 ASSESSMENT — MIFFLIN-ST. JEOR: SCORE: 1765.82

## 2020-12-28 NOTE — PROGRESS NOTES
Sports Medicine Clinic Visit    PCP: Jairo Hung    Chintan Niño is a 40 year old male who is seen  as a self referral presenting with right hip pain.  Pain in his groin and wraps around to his posterior hip.  Pain with prolonged standing, lifting.  Pain has been present for a few months with no relief with chiropractic care.    May have noticed the pain after lifting a 5th wheel plate and threw it into the back of the truck.     Injury: gradual onset   **  No pain currently seated in clinic. However, if on feet, and if moving (e.g., bending, lifting) gets some pain. Starts in right groin area, then notes into low back.  Has noted some tenderness in right lower quadrant. No noted bulge/swelling.      Location of Pain: right hip   Duration of Pain: 6 month(s)  Rating of Pain at worst: 8/10  Rating of Pain Currently: 2/10  Symptoms are better with: Rest  Symptoms are worse with: lifting, prolonged standing, golfing    Additional Features:   Positive:    Negative: swelling, bruising, popping, grinding, catching, locking, instability, paresthesias, numbness, weakness, pain in other joints and systemic symptoms  Other evaluation and/or treatments so far consists of: chiropractic treatments   Prior History of related problems: denies for hip, HX of low back pain treated by chiropractic care     Social History: sells insurance, owns exterior business     Review of Systems  Musculoskeletal: as above  Remainder of review of systems is negative including constitutional, CV, pulmonary, GI, Skin and Neurologic except as noted in HPI or medical history.    Past Medical History:   Diagnosis Date     GERD (gastroesophageal reflux disease) 1/20/2011     Past Surgical History:   Procedure Laterality Date     APPENDECTOMY OPEN CHILD       ENHANCE LASER REFRACTIVE BILATERAL EXISTING PT IN PARAMETERS       Family History   Problem Relation Age of Onset     Diabetes Mother      Retinal detachment Mother      Cancer Father         " Bone cancer     Glaucoma Father      Cerebrovascular Disease Maternal Grandmother      Asthma No family hx of      C.A.D. No family hx of      Hypertension No family hx of      Breast Cancer No family hx of      Cancer - colorectal No family hx of      Prostate Cancer No family hx of      Macular Degeneration No family hx of      Social History     Socioeconomic History     Marital status:      Spouse name: Not on file     Number of children: Not on file     Years of education: Not on file     Highest education level: Not on file   Occupational History     Not on file   Social Needs     Financial resource strain: Not on file     Food insecurity     Worry: Not on file     Inability: Not on file     Transportation needs     Medical: Not on file     Non-medical: Not on file   Tobacco Use     Smoking status: Former Smoker     Smokeless tobacco: Former User   Substance and Sexual Activity     Alcohol use: No     Drug use: No     Sexual activity: Yes     Partners: Female   Lifestyle     Physical activity     Days per week: Not on file     Minutes per session: Not on file     Stress: Not on file   Relationships     Social connections     Talks on phone: Not on file     Gets together: Not on file     Attends [employer] service: Not on file     Active member of club or organization: Not on file     Attends meetings of clubs or organizations: Not on file     Relationship status: Not on file     Intimate partner violence     Fear of current or ex partner: Not on file     Emotionally abused: Not on file     Physically abused: Not on file     Forced sexual activity: Not on file   Other Topics Concern     Parent/sibling w/ CABG, MI or angioplasty before 65F 55M? Not Asked   Social History Narrative     Not on file       Objective  /84   Ht 1.702 m (5' 7\")   Wt 90.7 kg (200 lb)   BMI 31.32 kg/m        GENERAL APPEARANCE: healthy, alert and no distress   GAIT: NORMAL  SKIN: no suspicious lesions or rashes  NEURO: " Normal strength and tone, mentation intact and speech normal  PSYCH:  mentation appears normal and affect normal/bright  HEENT: no scleral icterus  CV: distal perfusion intact  RESP: nonlabored breathing      Low back exam:    Inspection:     no visible deformity in the low back       normal skin       normal vascular       normal lymphatic    ROM:     Flexion hands to ankles, with right anterior hip/RLQ pain; similar with extension  Lateral bending not much change    Strength: LE intact  No change with single leg hip flexion testing    Special tests:     straight leg raise left neg        straight leg raise right neg        slump test neg             Right hip exam    Inspection:        no edema or ecchymosis in hip area    ROM:       Full active and passive ROM   No change with testing    Strength:        flexion        abduction        adduction   Grossly intact, no change    Skin:       well perfused       capillary refill brisk    Special Tests:        neg (-) FADIR       Log roll neg        abd wall with some tenderness right lower quadrant  Well healed surgical scar RLQ    Some pain in this area with activating abdominal wall, with bilateral active straight leg raise    Radiology  Visualized radiographs as noted below, and reviewed the images with the patient; if the report was available at the time of the visit, the report was reviewed as well.      Recent Results (from the past 744 hour(s))   XR Lumbar Spine 2/3 Views    Narrative    LUMBAR SPINE TWO TO THREE VIEWS  12/28/2020 3:58 PM     HISTORY: Right hip pain.    COMPARISON: None.      Impression    IMPRESSION: Lumbar vertebrae are normally aligned. Mild intervertebral  disc space narrowing at L5-S1. Remaining intervertebral disc spaces  are well maintained. No compression deformity or acute fracture. SI  joints intact.    MAGNUS KEYS MD   XR Pelvis w Hip Right 1 View    Narrative    XR PELVIS AND RIGHT HIP ONE VIEW   12/28/2020 4:09 PM     HISTORY:  Right hip pain    COMPARISON: Abdominal x-rays dated 3/13/2011.    FINDINGS:  No acute fractures or dislocations. Alignment is anatomic.  Soft tissues are normal.   Hip and SI joints are normal in appearance.  Phleboliths are again seen in the pelvis.      Impression    IMPRESSION: Negative pelvis and bilateral hip x-rays. Etiology for  patient's right hip pain is not definitely seen on this study.    STEFFANIE WEI MD           Assessment:  1. Right hip pain    2. Abdominal wall pain in right lower quadrant        Plan:  Discussed the assessment with the patient.  We discussed abdominal wall source, possible sports hernia. He does have surgical history appendectomy when young.  We discussed the following: symptom treatment, activity modification/rest, imaging, rehab and referral to surgeon. Following discussion, plan:  We discussed trial PT next. Following discussion, he is more interested in additional imaging first.  Discussed imaging modalities. These include possibly CT or MRI abdominal wall, as well as focus US for possible defect/hernia. He is interested. Will start with US, order placed.  Follow up: contact pt with MRI results. Likely trial PT after that, but await results. We also discussed potential future referral to general surgeon if ongoing abdominal wall issues.  Questions answered. Discussed signs and symptoms that may indicate more serious issues; the patient was instructed to seek appropriate care if noted. Chintan indicates understanding of these issues and agrees with the plan.      Mike Peterson DO, CAQ              Patient Instructions    Advanced imaging is done by appointment. Some insurance require a prior authorization to be completed which may delay the time until you are able to schedule your appointment. Please call [facility]: [phone] to schedule your US.  Depending on your availability you can usually schedule within the next 1-2 days.    The clinic  will call you with results, if you have not heard from the clinic within 3-4 days following your USplease contact us at the number listed below.   If you have any further questions for your physician or physician s care team you can call [phone] and use option 3 to leave a voice message. Calls received during business hours will be returned same day.            This note consists of symbols derived from keyboarding, dictation and/or voice recognition software. As a result, there may be errors in the script that have gone undetected. Please consider this when interpreting information found in this chart.

## 2020-12-28 NOTE — LETTER
12/28/2020         RE: Chintan Niño  [address] [place] [address]        Dear Colleague,    Thank you for referring your patient, Chintan Niño, to the [facility]. Please see a copy of my visit note below.    Sports Medicine Clinic Visit    PCP: Jairo Hung    Chintan Niño is a 40 year old male who is seen  as a self referral presenting with right hip pain.  Pain in his groin and wraps around to his posterior hip.  Pain with prolonged standing, lifting.  Pain has been present for a few months with no relief with chiropractic care.    May have noticed the pain after lifting a 5th wheel plate and threw it into the back of the truck.     Injury: gradual onset   **  No pain currently seated in clinic. However, if on feet, and if moving (e.g., bending, lifting) gets some pain. Starts in right groin area, then notes into low back.  Has noted some tenderness in right lower quadrant. No noted bulge/swelling.      Location of Pain: right hip   Duration of Pain: 6 month(s)  Rating of Pain at worst: 8/10  Rating of Pain Currently: 2/10  Symptoms are better with: Rest  Symptoms are worse with: lifting, prolonged standing, golfing    Additional Features:   Positive:    Negative: swelling, bruising, popping, grinding, catching, locking, instability, paresthesias, numbness, weakness, pain in other joints and systemic symptoms  Other evaluation and/or treatments so far consists of: chiropractic treatments   Prior History of related problems: denies for hip, HX of low back pain treated by chiropractic care     Social History: sells insurance, owns exterior business     Review of Systems  Musculoskeletal: as above  Remainder of review of systems is negative including constitutional, CV, pulmonary, GI, Skin and Neurologic except as noted in HPI or medical history.    Past Medical History:   Diagnosis Date     GERD (gastroesophageal reflux disease) 1/20/2011     Past  Surgical History:   Procedure Laterality Date     APPENDECTOMY OPEN CHILD       ENHANCE LASER REFRACTIVE BILATERAL EXISTING PT IN PARAMETERS       Family History   Problem Relation Age of Onset     Diabetes Mother      Retinal detachment Mother      Cancer Father         Bone cancer     Glaucoma Father      Cerebrovascular Disease Maternal Grandmother      Asthma No family hx of      C.A.D. No family hx of      Hypertension No family hx of      Breast Cancer No family hx of      Cancer - colorectal No family hx of      Prostate Cancer No family hx of      Macular Degeneration No family hx of      Social History     Socioeconomic History     Marital status:      Spouse name: Not on file     Number of children: Not on file     Years of education: Not on file     Highest education level: Not on file   Occupational History     Not on file   Social Needs     Financial resource strain: Not on file     Food insecurity     Worry: Not on file     Inability: Not on file     Transportation needs     Medical: Not on file     Non-medical: Not on file   Tobacco Use     Smoking status: Former Smoker     Smokeless tobacco: Former User   Substance and Sexual Activity     Alcohol use: No     Drug use: No     Sexual activity: Yes     Partners: Female   Lifestyle     Physical activity     Days per week: Not on file     Minutes per session: Not on file     Stress: Not on file   Relationships     Social connections     Talks on phone: Not on file     Gets together: Not on file     Attends Zoroastrianism service: Not on file     Active member of club or organization: Not on file     Attends meetings of clubs or organizations: Not on file     Relationship status: Not on file     Intimate partner violence     Fear of current or ex partner: Not on file     Emotionally abused: Not on file     Physically abused: Not on file     Forced sexual activity: Not on file   Other Topics Concern     Parent/sibling w/ CABG, MI or angioplasty before 65F  "55M? Not Asked   Social History Narrative     Not on file       Objective  /84   Ht 1.702 m (5' 7\")   Wt 90.7 kg (200 lb)   BMI 31.32 kg/m        GENERAL APPEARANCE: healthy, alert and no distress   GAIT: NORMAL  SKIN: no suspicious lesions or rashes  NEURO: Normal strength and tone, mentation intact and speech normal  PSYCH:  mentation appears normal and affect normal/bright  HEENT: no scleral icterus  CV: distal perfusion intact  RESP: nonlabored breathing      Low back exam:    Inspection:     no visible deformity in the low back       normal skin       normal vascular       normal lymphatic    ROM:     Flexion hands to ankles, with right anterior hip/RLQ pain; similar with extension  Lateral bending not much change    Strength: LE intact  No change with single leg hip flexion testing    Special tests:     straight leg raise left neg        straight leg raise right neg        slump test neg             Right hip exam    Inspection:        no edema or ecchymosis in hip area    ROM:       Full active and passive ROM   No change with testing    Strength:        flexion        abduction        adduction   Grossly intact, no change    Skin:       well perfused       capillary refill brisk    Special Tests:        neg (-) FADIR       Log roll neg        abd wall with some tenderness right lower quadrant  Well healed surgical scar RLQ    Some pain in this area with activating abdominal wall, with bilateral active straight leg raise    Radiology  Visualized radiographs as noted below, and reviewed the images with the patient; if the report was available at the time of the visit, the report was reviewed as well.      Recent Results (from the past 744 hour(s))   XR Lumbar Spine 2/3 Views    Narrative    LUMBAR SPINE TWO TO THREE VIEWS  12/28/2020 3:58 PM     HISTORY: Right hip pain.    COMPARISON: None.      Impression    IMPRESSION: Lumbar vertebrae are normally aligned. Mild intervertebral  disc space narrowing " at L5-S1. Remaining intervertebral disc spaces  are well maintained. No compression deformity or acute fracture. SI  joints intact.    MAGNUS KEYS MD   XR Pelvis w Hip Right 1 View    Narrative    XR PELVIS AND RIGHT HIP ONE VIEW   12/28/2020 4:09 PM     HISTORY: Right hip pain    COMPARISON: Abdominal x-rays dated 3/13/2011.    FINDINGS:  No acute fractures or dislocations. Alignment is anatomic.  Soft tissues are normal.   Hip and SI joints are normal in appearance.  Phleboliths are again seen in the pelvis.      Impression    IMPRESSION: Negative pelvis and bilateral hip x-rays. Etiology for  patient's right hip pain is not definitely seen on this study.    STEFFANIE WEI MD           Assessment:  1. Right hip pain    2. Abdominal wall pain in right lower quadrant        Plan:  Discussed the assessment with the patient.  We discussed abdominal wall source, possible sports hernia. He does have surgical history appendectomy when young.  We discussed the following: symptom treatment, activity modification/rest, imaging, rehab and referral to surgeon. Following discussion, plan:  We discussed trial PT next. Following discussion, he is more interested in additional imaging first.  Discussed imaging modalities. These include possibly CT or MRI abdominal wall, as well as focus US for possible defect/hernia. He is interested. Will start with US, order placed.  Follow up: contact pt with MRI results. Likely trial PT after that, but await results. We also discussed potential future referral to general surgeon if ongoing abdominal wall issues.  Questions answered. Discussed signs and symptoms that may indicate more serious issues; the patient was instructed to seek appropriate care if noted. Chintan indicates understanding of these issues and agrees with the plan.      Mike Peterson, , CAQ              Patient Instructions    Advanced imaging is done by appointment. Some insurance require a prior authorization to be  completed which may delay the time until you are able to schedule your appointment. Please call [facility]: [phone] to schedule your US.  Depending on your availability you can usually schedule within the next 1-2 days.    The clinic will call you with results, if you have not heard from the clinic within 3-4 days following your USplease contact us at the number listed below.   If you have any further questions for your physician or physician s care team you can call [phone] and use option 3 to leave a voice message. Calls received during business hours will be returned same day.            This note consists of symbols derived from keyboarding, dictation and/or voice recognition software. As a result, there may be errors in the script that have gone undetected. Please consider this when interpreting information found in this chart.          Again, thank you for allowing me to participate in the care of your patient.        Sincerely,        Mike Peterson, DO

## 2020-12-28 NOTE — PATIENT INSTRUCTIONS
Advanced imaging is done by appointment. Some insurance require a prior authorization to be completed which may delay the time until you are able to schedule your appointment. Please call [place] [facility] [place] [facility] [place]: [phone] to schedule your US.  Depending on your availability you can usually schedule within the next 1-2 days.    The clinic will call you with results, if you have not heard from the clinic within 3-4 days following your USplease contact us at the number listed below.   If you have any further questions for your physician or physician s care team you can call [phone] and use option 3 to leave a voice message. Calls received during business hours will be returned same day.

## 2020-12-30 ENCOUNTER — ANCILLARY PROCEDURE (OUTPATIENT)
Dept: ULTRASOUND IMAGING | Facility: CLINIC | Age: 40
End: 2020-12-30
Attending: PEDIATRICS
Payer: COMMERCIAL

## 2020-12-30 DIAGNOSIS — M25.551 RIGHT HIP PAIN: ICD-10-CM

## 2020-12-30 DIAGNOSIS — R10.31 ABDOMINAL WALL PAIN IN RIGHT LOWER QUADRANT: ICD-10-CM

## 2021-01-02 ENCOUNTER — TELEPHONE (OUTPATIENT)
Dept: ORTHOPEDICS | Facility: CLINIC | Age: 41
End: 2021-01-02

## 2021-01-02 DIAGNOSIS — R10.31 ABDOMINAL WALL PAIN IN RIGHT LOWER QUADRANT: Primary | ICD-10-CM

## 2021-01-02 NOTE — TELEPHONE ENCOUNTER
Results for orders placed or performed in visit on 12/30/20   US Hernia Evaluation    Narrative    ULTRASOUND HERNIA EVALUATION December 30, 2020 9:38 AM     HISTORY: Right lower quadrant pain and abdominal wall tenderness.  History of open appendectomy. Right hip pain. Abdominal wall pain in  right lower quadrant.    COMPARISON: None.      Impression    IMPRESSION: Focused ultrasound scanning in the right lower quadrant  demonstrates no evidence of hernia. No abnormal mass or fluid  collection demonstrated.    BIBIANA MONCADA MD

## 2021-01-04 NOTE — TELEPHONE ENCOUNTER
US did not clearly identify a hernia.  We had discussed trial of PT for his issues, including abdominal wall pain, at his visit. Would offer this.  Monitor course with PT 4 weeks, go from there.  I would be happy to have a visit with the patient (in person, by video, or by phone) to discuss further if that would be helpful.  Thanks.  Mike Peterson, DO, CAQ

## 2021-01-05 NOTE — TELEPHONE ENCOUNTER
Called and spoke with patient.  Relayed information below.  He is agreeable to PT.  Order placed.  He will contact clinic if not improving over next 4-6 weeks  Maryan Long MS ATC

## 2021-03-08 ENCOUNTER — TELEPHONE (OUTPATIENT)
Dept: NEUROLOGY | Facility: CLINIC | Age: 41
End: 2021-03-08

## 2021-03-15 ENCOUNTER — MYC MEDICAL ADVICE (OUTPATIENT)
Dept: NEUROLOGY | Facility: CLINIC | Age: 41
End: 2021-03-15

## 2021-05-20 ENCOUNTER — VIRTUAL VISIT (OUTPATIENT)
Dept: NEUROLOGY | Facility: CLINIC | Age: 41
End: 2021-05-20
Payer: COMMERCIAL

## 2021-05-20 DIAGNOSIS — G43.709 CHRONIC MIGRAINE WITHOUT AURA WITHOUT STATUS MIGRAINOSUS, NOT INTRACTABLE: Primary | ICD-10-CM

## 2021-05-20 PROCEDURE — 99214 OFFICE O/P EST MOD 30 MIN: CPT | Mod: GT | Performed by: PSYCHIATRY & NEUROLOGY

## 2021-05-20 RX ORDER — ZOLMITRIPTAN 5 MG/1
1 SPRAY NASAL
Qty: 9 EACH | Refills: 11 | Status: SHIPPED | OUTPATIENT
Start: 2021-05-20 | End: 2021-12-01

## 2021-05-20 NOTE — PROGRESS NOTES
"Chintan is a 41 year old who is being evaluated via a billable video visit.      How would you like to obtain your AVS? Mychart  If the video visit is dropped, the invitation should be resent by: [phone]      Video Start Time: 9:31 AM  Video-Visit Details    Type of service:  Video Visit    Video End Time:9:46 AM    Originating Location (pt. Location): Home    Distant Location (provider location):  [facility] [place]     Platform used for Video Visit: [employer]    [facility] [place]  Neurology Progress Note    Subjective:    Mr. Niño is a 41-year-old man who presents for follow-up of headache.  Last seen in headache clinic November 18, 2020.    He is doing \"great\" with Emgality. He has some injection site reaction with some rash and itchiness when he injected in his stomach (this occurred once). This lasted about four days. He injects in his leg now.    He went from 20+ severe headache days per month, to 4 severe headache days per month. He denies other headaches.     He is not missing work. He can't tolerate alcohol, a prominent trigger for him.    His migraine attacks are similar to previous. They start with smaller headaches posteriorly and radiate forward behind an eye, and build over hours to become severe. They are not as intense as previously, getting up to 7-8/10. He is no longer nauseous with headache.     Sumatriptan NS is not helpful until the repeat dose. He is taking two doses consistently. He is not taking naproxen or ondansetron.     Objective:    Vitals: There were no vitals taken for this visit.  General: Cooperative, NAD  Neurologic:  Mental Status: Fully alert, attentive and oriented. Speech clear and fluent.   Cranial Nerves: Facial movements symmetric.   Motor: No abnormal movements.      Assessment/Plan:   Chintan Niño is a 41-year-old man who presents for follow-up of headache.    #Chronic migraine without " aura  Patient with migraines since childhood.  Reported significant improvement in headache frequency and severity after starting Emgality.    We discussed headache preventative measures.  Prior to starting Emgality, ineffective trials included venlafaxine, amitriptyline, bupropion, topiramate.  He previously had reported greater than 20 severe headache days per month.  After starting Emgality, he reports 4 severe headaches per month.  -Continue Emgality as preventative measure, to be injected subcutaneously every 28 days.  -If skin rash recurs associated with Emgality injection, recommend diphenhydramine use immediately prior to injection.  -In the future, if Emgality is no longer effective, then could try botulinum toxin injections at that time.    We discussed acute headache treatment measures.  He had previously tried multiple oral triptan therapies poorly tolerated due to nausea.  Sumatriptan injectable therapy was poorly tolerated due to pain.  Sumatriptan nasal spray consistently requires a second dose.  -For acute treatment of mild headache, I recommend naproxen 500 mg as needed, not to exceed more than 14 days/month to avoid medication overuse.  -For acute treatment of moderate to severe headache, he will try zolmitriptan nasal spray as an alternative to sumatriptan.  This is also to be taken at the onset of headache, and may repeat in 2 hours if needed.  This is not to exceed more than 9 days/month to avoid medication overuse.  He understands that he is not to use both sumatriptan and zolmitriptan on the same day.  -For nausea related to headache, he has prescription for ondansetron 4 mg tablets to be used as needed.  -Should the above measures result in insufficient relief or are not tolerated, could consider ketorolac nasal spray.  Also could pursue alternative brand of sumatriptan nasal spray.    Return to care in 6-12 months.    Patient was seen and discussed with attending physician,   Joseph.    Stephany Hameed MD  PGY-3  Neurology    Physician Attestation   I, Kelin Ruiz MD, saw this patient and agree with the findings and plan of care as documented in the note.      Kelin Ruiz MD

## 2021-05-20 NOTE — LETTER
"5/20/2021       RE: Chintan Niño  [address]  [facility] [address]     Dear Colleague,    Thank you for referring your patient, Chintan Niño, to the [facility] NEUROLOGY CLINIC [place] at [facility]. Please see a copy of my visit note below.    Chintan is a 41 year old who is being evaluated via a billable video visit.      How would you like to obtain your AVS? Mychart  If the video visit is dropped, the invitation should be resent by: [phone]    Video-Visit Details    Type of service:  Video Visit    Video Start Time: 9:31 AM    Video End Time:9:46 AM    Originating Location (pt. Location): Home    Distant Location (provider location):  [facility] NEUROLOGY CLINIC [place]     Platform used for Video Visit: [employer]    [facility]  Neurology Progress Note    Subjective:    Mr. Niño is a 41-year-old man who presents for follow-up of headache.  Last seen in headache clinic November 18, 2020.    He is doing \"great\" with Emgality. He has some injection site reaction with some rash and itchiness when he injected in his stomach (this occurred once). This lasted about four days. He injects in his leg now.    He went from 20+ severe headache days per month, to 4 severe headache days per month. He denies other headaches.     He is not missing work. He can't tolerate alcohol, a prominent trigger for him.    His migraine attacks are similar to previous. They start with smaller headaches posteriorly and radiate forward behind an eye, and build over hours to become severe. They are not as intense as previously, getting up to 7-8/10. He is no longer nauseous with headache.     Sumatriptan NS is not helpful until the repeat dose. He is taking two doses consistently. He is not taking naproxen or ondansetron.     Objective:    Vitals: There were no vitals taken for this visit.  General: " Cooperative, NAD  Neurologic:  Mental Status: Fully alert, attentive and oriented. Speech clear and fluent.   Cranial Nerves: Facial movements symmetric.   Motor: No abnormal movements.      Assessment/Plan:   Chintan Niño is a 41-year-old man who presents for follow-up of headache.    #Chronic migraine without aura  Patient with migraines since childhood.  Reported significant improvement in headache frequency and severity after starting Emgality.    We discussed headache preventative measures.  Prior to starting Emgality, ineffective trials included venlafaxine, amitriptyline, bupropion, topiramate.  He previously had reported greater than 20 severe headache days per month.  After starting Emgality, he reports 4 severe headaches per month.  -Continue Emgality as preventative measure, to be injected subcutaneously every 28 days.  -If skin rash recurs associated with Emgality injection, recommend diphenhydramine use immediately prior to injection.  -In the future, if Emgality is no longer effective, then could try botulinum toxin injections at that time.    We discussed acute headache treatment measures.  He had previously tried multiple oral triptan therapies poorly tolerated due to nausea.  Sumatriptan injectable therapy was poorly tolerated due to pain.  Sumatriptan nasal spray consistently requires a second dose.  -For acute treatment of mild headache, I recommend naproxen 500 mg as needed, not to exceed more than 14 days/month to avoid medication overuse.  -For acute treatment of moderate to severe headache, he will try zolmitriptan nasal spray as an alternative to sumatriptan.  This is also to be taken at the onset of headache, and may repeat in 2 hours if needed.  This is not to exceed more than 9 days/month to avoid medication overuse.  He understands that he is not to use both sumatriptan and zolmitriptan on the same day.  -For nausea related to headache, he has prescription for ondansetron 4 mg tablets  to be used as needed.  -Should the above measures result in insufficient relief or are not tolerated, could consider ketorolac nasal spray.  Also could pursue alternative brand of sumatriptan nasal spray.    Return to care in 6-12 months.    Patient was seen and discussed with attending physician, Dr. Ruiz.    Stephany Hameed MD  PGY-3  Neurology    Physician Attestation   I, Kelin Ruiz MD, saw this patient and agree with the findings and plan of care as documented in the note.      Kelin Ruiz MD

## 2021-06-02 ENCOUNTER — TELEPHONE (OUTPATIENT)
Dept: NEUROLOGY | Facility: CLINIC | Age: 41
End: 2021-06-02

## 2021-06-02 NOTE — TELEPHONE ENCOUNTER
Prior Authorization Retail Medication Request     Medication/Dose: galcanezumab-gnlm (EMGALITY) 120 MG/ML injection; Inject 1 mL (120 mg) Subcutaneous every 28 days - Subcutaneous; Inject 2 mLs (240 mg) Subcutaneous once for 1 dose - Subcutaneous  ICD code (if different than what is on RX):  G43.709  Previously Tried and Failed:  He previously took venlafaxine, amitriptyline, bupropion, topiramate,  Rationale:  Chronic migraine 20-30 headache days a month.      Insurance Name:  [employer]  Insurance ID:  [id number]         Pharmacy Information (if different than what is on RX)  Name:    Phone:

## 2021-06-03 NOTE — TELEPHONE ENCOUNTER
Central Prior Authorization Team   Phone: [phone]      P/A demographics have been submitted to insurance, am awaiting question set.

## 2021-06-03 NOTE — TELEPHONE ENCOUNTER
Central Prior Authorization Team   Phone: [phone]      PA Initiation    Medication: Emgality 120MG/ML   Insurance Company: [employer] - Phone [phone] Fax [phone]  Pharmacy Filling the Rx: [id number] [address] [facility]  [address]  Filling Pharmacy Phone: [phone]  Filling Pharmacy Fax:    Start Date: 6/3/2021

## 2021-06-08 NOTE — TELEPHONE ENCOUNTER
Prior Authorization Approval    Authorization Effective Date: 5/3/2021  Authorization Expiration Date: 6/3/2022  Medication: Emgality 120MG/ML   Approved Dose/Quantity: 1ml  Reference #: [id number]   Insurance Company: [employer] - Phone [phone] Fax [phone]  Expected CoPay:         Which Pharmacy is filling the prescription (Not needed for infusion/clinic administered): [id number] [place] [address] AT [facility] [address]  Pharmacy Notified: Yes  Patient Notified: No

## 2021-10-02 ENCOUNTER — HEALTH MAINTENANCE LETTER (OUTPATIENT)
Age: 41
End: 2021-10-02

## 2021-12-01 ENCOUNTER — VIRTUAL VISIT (OUTPATIENT)
Dept: NEUROLOGY | Facility: CLINIC | Age: 41
End: 2021-12-01
Payer: COMMERCIAL

## 2021-12-01 DIAGNOSIS — G43.709 CHRONIC MIGRAINE WITHOUT AURA WITHOUT STATUS MIGRAINOSUS, NOT INTRACTABLE: ICD-10-CM

## 2021-12-01 PROCEDURE — 99213 OFFICE O/P EST LOW 20 MIN: CPT | Mod: GT | Performed by: PSYCHIATRY & NEUROLOGY

## 2021-12-01 RX ORDER — ZOLMITRIPTAN 5 MG/1
1 SPRAY NASAL
Qty: 9 EACH | Refills: 11 | Status: SHIPPED | OUTPATIENT
Start: 2021-12-01 | End: 2021-12-01

## 2021-12-01 RX ORDER — ZOLMITRIPTAN 5 MG/1
1 SPRAY NASAL
Qty: 9 EACH | Refills: 11 | Status: SHIPPED | OUTPATIENT
Start: 2021-12-01 | End: 2022-12-07

## 2021-12-01 ASSESSMENT — HEADACHE IMPACT TEST (HIT 6)
WHEN YOU HAVE A HEADACHE HOW OFTEN DO YOU WISH YOU COULD LIE DOWN: ALWAYS
WHEN YOU HAVE HEADACHES HOW OFTEN IS THE PAIN SEVERE: ALWAYS
HOW OFTEN HAVE YOU FELT TOO TIRED TO WORK BECAUSE OF YOUR HEADACHES: SOMETIMES
HOW OFTEN HAVE YOU FELT FED UP OR IRRITATED BECAUSE OF YOUR HEADACHES: SOMETIMES
HIT6 TOTAL SCORE: 67
HOW OFTEN DO HEADACHES LIMIT YOUR DAILY ACTIVITIES: VERY OFTEN
HOW OFTEN DID HEADACHS LIMIT CONCENTRATION ON WORK OR DAILY ACTIVITY: SOMETIMES

## 2021-12-01 NOTE — PROGRESS NOTES
Chintan is a 41 year old who is being evaluated via a billable video visit.      How would you like to obtain your AVS? MyChart  If the video visit is dropped, the invitation should be resent by: Text to cell phone: [phone]  Will anyone else be joining your video visit? No      Video Start Time: 11:49 AM  Video-Visit Details    Type of service:  Video Visit    Video End Time:12 pm    Originating Location (pt. Location): Home    Distant Location (provider location):  [facility] NEUROLOGY CLINIC [place]     Platform used for Video Visit: AmWellMIGRAINE DISABILITY ASSESSMENT (MIDAS)    On how many days in the last 3 months did you miss work or school because of your headaches?  0    How many days in the last 3 months was your productivity at work or school reduced by half or more because of your headaches? (Do not include days you counted in question 1 where you missed work or school.)  0    On how many days in the last 3 months did you not do household work (such as housework, home repairs and maintenance, shopping, caring for children and relatives) because of your headaches?  0    How many days in the last 3 months was your productivity in household work reduced by half or more because of your headaches? (Do not include days you counted in question 3 where you did not do household work).  0    On how many days in the last 3 months did you miss family, social, or lesiure activities because of your headaches?  0    MIDAS Total Score:     On how many days in the last 3 months did you have a headache? (If a headache lasted more than 1 day, count each day.) 9    On a scale of 0 - 10, on average how painful were these headaches (where 0 = no pain at all, and 10 = pain as bad as it can be.)  9    [facility]  Neurology Progress Note    Subjective:    Mr. Niño returns for follow-up of chronic migraine without aura.  I last saw him 6 months ago, at  "which time he continued Emgality and started zolmitriptan nasal spray.    He has been \"really good\" lately. He's had 3 migraine attacks in the last three months.  He attributes his improvement to Emgality subcutaneous injection every 28 days.  When he does have a rare attack, it tends to occur in the week prior to his next Emgality injection, suggesting a wearing off effect.    When he does have a breakthrough attack, he takes zolmitriptan NS, which works better than sumatriptan nasal spray.  He like to continue this.    Emgality previously caused an injection site reaction.  This has not recurred.  The injection itself is painful, however, so he is experimented with different areas.  He is most comfortable injecting in the leg.    Objective:    Vitals: There were no vitals taken for this visit.  General: Cooperative, NAD  Neurologic:  Mental Status: Fully alert, attentive and oriented. Speech clear and fluent.   Cranial Nerves: Facial movements symmetric.   Motor: No abnormal movements.      Assessment/Plan:   Chintan Niño is a 41-year-old man who presents for follow-up of chronic migraine without aura.  He has had a significant reduction in headache days with Emgality, reducing from 20 severe headache days a month to 1 headache day per month.    He has not had subsequent rash with Emgality injections, as was reported on one occasion.  I recommend he continue Emgality subcutaneous injection every 28 days.  -I renewed his prescription for another year and sent this to the [facility] specialty pharmacy, per his request.  -In the future, if Emgality is no longer effective, then could try botulinum toxin injections at that time.    For acute treatment, zolmitriptan nasal spray is more effective than sumatriptan nasal spray.  I will renew this prescription for him.  We will cancel the sumatriptan prescription.  Uses [facility] [employer] for this prescription.  -For acute treatment of mild headache, I recommend naproxen 500 mg as " needed, not to exceed more than 14 days/month to avoid medication overuse.  -For acute treatment of moderate to severe headache, he will continue zolmitriptan nasal spray. This is also to be taken at the onset of headache, and may repeat in 2 hours if needed.  This is not to exceed more than 9 days/month to avoid medication overuse.    -For nausea related to headache, he has prescription for ondansetron 4 mg tablets to be used as needed.  -Should the above measures result in insufficient relief or are not tolerated, could consider ketorolac nasal spray.  Also could pursue alternative brand of sumatriptan nasal spray.    I will plan to see him back in 1 year, or sooner if needed.    Kelin Ruiz MD  Neurology

## 2021-12-01 NOTE — LETTER
"12/1/2021       RE: Chintan Niño  [address] [place] [address]     Dear Colleague,    Thank you for referring your patient, Chintan Niño, to the [facility]UR[facility] [place] at [facility]. Please see a copy of my visit note below.    MIGRAINE DISABILITY ASSESSMENT (MIDAS)    On how many days in the last 3 months did you miss work or school because of your headaches?  0    How many days in the last 3 months was your productivity at work or school reduced by half or more because of your headaches? (Do not include days you counted in question 1 where you missed work or school.)  0    On how many days in the last 3 months did you not do household work (such as housework, home repairs and maintenance, shopping, caring for children and relatives) because of your headaches?  0    How many days in the last 3 months was your productivity in household work reduced by half or more because of your headaches? (Do not include days you counted in question 3 where you did not do household work).  0    On how many days in the last 3 months did you miss family, social, or lesiure activities because of your headaches?  0    MIDAS Total Score:     On how many days in the last 3 months did you have a headache? (If a headache lasted more than 1 day, count each day.) 9    On a scale of 0 - 10, on average how painful were these headaches (where 0 = no pain at all, and 10 = pain as bad as it can be.)  9    [facility]    [facility]  Neurology Progress Note    Subjective:    Mr. Niño returns for follow-up of chronic migraine without aura.  I last saw him 6 months ago, at which time he continued Emgality and started zolmitriptan nasal spray.    He has been \"really good\" lately. He's had 3 migraine attacks in the last three months.  He attributes his improvement to Emgality subcutaneous injection every 28 days.  " When he does have a rare attack, it tends to occur in the week prior to his next Emgality injection, suggesting a wearing off effect.    When he does have a breakthrough attack, he takes zolmitriptan NS, which works better than sumatriptan nasal spray.  He like to continue this.    Emgality previously caused an injection site reaction.  This has not recurred.  The injection itself is painful, however, so he is experimented with different areas.  He is most comfortable injecting in the leg.    Objective:    Vitals: There were no vitals taken for this visit.  General: Cooperative, NAD  Neurologic:  Mental Status: Fully alert, attentive and oriented. Speech clear and fluent.   Cranial Nerves: Facial movements symmetric.   Motor: No abnormal movements.      Assessment/Plan:   Chintan iNño is a 41-year-old man who presents for follow-up of chronic migraine without aura.  He has had a significant reduction in headache days with Emgality, reducing from 20 severe headache days a month to 1 headache day per month.    He has not had subsequent rash with Emgality injections, as was reported on one occasion.  I recommend he continue Emgality subcutaneous injection every 28 days.  -I renewed his prescription for another year and sent this to the [facility] specialty pharmacy, per his request.  -In the future, if Emgality is no longer effective, then could try botulinum toxin injections at that time.    For acute treatment, zolmitriptan nasal spray is more effective than sumatriptan nasal spray.  I will renew this prescription for him.  We will cancel the sumatriptan prescription.  Uses [employer] for this prescription.  -For acute treatment of mild headache, I recommend naproxen 500 mg as needed, not to exceed more than 14 days/month to avoid medication overuse.  -For acute treatment of moderate to severe headache, he will continue zolmitriptan nasal spray. This is also to be taken at the onset of headache, and may repeat in 2 hours  if needed.  This is not to exceed more than 9 days/month to avoid medication overuse.    -For nausea related to headache, he has prescription for ondansetron 4 mg tablets to be used as needed.  -Should the above measures result in insufficient relief or are not tolerated, could consider ketorolac nasal spray.  Also could pursue alternative brand of sumatriptan nasal spray.    I will plan to see him back in 1 year, or sooner if needed.    Kelin Ruiz MD  Neurology         Again, thank you for allowing me to participate in the care of your patient.      Sincerely,    Kelin Ruiz MD

## 2022-01-17 ENCOUNTER — E-VISIT (OUTPATIENT)
Dept: URGENT CARE | Facility: CLINIC | Age: 42
End: 2022-01-17
Payer: COMMERCIAL

## 2022-01-17 DIAGNOSIS — Z20.822 CLOSE EXPOSURE TO 2019 NOVEL CORONAVIRUS: Primary | ICD-10-CM

## 2022-01-17 PROCEDURE — 99421 OL DIG E/M SVC 5-10 MIN: CPT | Performed by: FAMILY MEDICINE

## 2022-01-22 ENCOUNTER — HEALTH MAINTENANCE LETTER (OUTPATIENT)
Age: 42
End: 2022-01-22

## 2022-02-28 ENCOUNTER — OFFICE VISIT (OUTPATIENT)
Dept: FAMILY MEDICINE | Facility: CLINIC | Age: 42
End: 2022-02-28
Payer: COMMERCIAL

## 2022-02-28 VITALS
HEART RATE: 74 BPM | RESPIRATION RATE: 14 BRPM | TEMPERATURE: 97.4 F | WEIGHT: 210.4 LBS | BODY MASS INDEX: 33.02 KG/M2 | SYSTOLIC BLOOD PRESSURE: 123 MMHG | DIASTOLIC BLOOD PRESSURE: 82 MMHG | OXYGEN SATURATION: 100 % | HEIGHT: 67 IN

## 2022-02-28 DIAGNOSIS — R30.0 DYSURIA: Primary | ICD-10-CM

## 2022-02-28 DIAGNOSIS — R35.0 URINARY FREQUENCY: ICD-10-CM

## 2022-02-28 LAB
ALBUMIN UR-MCNC: NEGATIVE MG/DL
APPEARANCE UR: CLEAR
BILIRUB UR QL STRIP: NEGATIVE
COLOR UR AUTO: YELLOW
GLUCOSE UR STRIP-MCNC: NEGATIVE MG/DL
HGB UR QL STRIP: NEGATIVE
KETONES UR STRIP-MCNC: ABNORMAL MG/DL
LEUKOCYTE ESTERASE UR QL STRIP: NEGATIVE
NITRATE UR QL: NEGATIVE
PH UR STRIP: 7 [PH] (ref 5–7)
PSA SERPL-MCNC: 0.74 UG/L (ref 0–4)
SP GR UR STRIP: 1.02 (ref 1–1.03)
UROBILINOGEN UR STRIP-ACNC: 0.2 E.U./DL

## 2022-02-28 PROCEDURE — 99213 OFFICE O/P EST LOW 20 MIN: CPT | Performed by: PHYSICIAN ASSISTANT

## 2022-02-28 PROCEDURE — G0103 PSA SCREENING: HCPCS | Performed by: PHYSICIAN ASSISTANT

## 2022-02-28 PROCEDURE — 81003 URINALYSIS AUTO W/O SCOPE: CPT | Performed by: PHYSICIAN ASSISTANT

## 2022-02-28 PROCEDURE — 36415 COLL VENOUS BLD VENIPUNCTURE: CPT | Performed by: PHYSICIAN ASSISTANT

## 2022-02-28 PROCEDURE — 87086 URINE CULTURE/COLONY COUNT: CPT | Performed by: PHYSICIAN ASSISTANT

## 2022-02-28 ASSESSMENT — PAIN SCALES - GENERAL: PAINLEVEL: NO PAIN (0)

## 2022-02-28 NOTE — PROGRESS NOTES
"  Assessment & Plan   Problem List Items Addressed This Visit     None      Visit Diagnoses     Dysuria    -  Primary    Relevant Orders    PSA, screen (Completed)    Urine Culture Aerobic Bacterial - lab collect (Completed)    Urinary frequency        Relevant Orders    UA macro with reflex to Microscopic and Culture - Clinc Collect (Completed)         Chintan Niño is a 41 year old male here with dysuria. Walked nearly 10 miles yesterday and admits to not hydrating well.    VExam without any abdominal or CVAT. Pt is afebrile & has not been vomiting. UA is not suggestive of UTI and UC unrevealing. PSA normal. Low suspicion for prostatitis. He will hydrated and FU with PMD in a week for recheck as needed. If symptoms worsen, develop back pain/fevers/vomiting go to ER for further treatment.     Complete history and physical exam as below. AF with normal VS.    DDx and Dx discussed with and explained to the pt to their satisfaction.  All questions were answered at this time. Pt expressed understanding of and agreement with this dx, tx, and plan. No further workup warranted and standard medication warnings given. I have given the patient a list of pertinent indications for re-evaluation. Will go to the Emergency Department if symptoms worsen or new concerning symptoms arise. Patient left in no apparent distress.      BMI:   Estimated body mass index is 33.42 kg/m  as calculated from the following:    Height as of this encounter: 1.69 m (5' 6.54\").    Weight as of this encounter: 95.4 kg (210 lb 6.4 oz).     See Patient Instructions    Return in about 1 week (around 3/7/2022) for a recheck of your symptoms if not improving, or call 911/go to an ER anytime if worsening.    MEGHAN [facility] PABLO Riley is a 41 year old who presents for the following health issues   HPI     Genitourinary - Male  Onset/Duration: 1 day  Description:   Dysuria (painful urination): YES. Feels " "like needles  Hematuria (blood in urine): no  Frequency: YES  Waking at night to urinate: no  Hesitancy (delay in urine): no  Retention (unable to empty): no  Decrease in urinary flow: YES  Incontinence: no  Progression of Symptoms:  same  Accompanying Signs & Symptoms:  Fever: no  Back/Flank pain: YES- back pain, low back left side  Urethral discharge: no  Testicle lumps/masses/pain: no  Nausea and/or vomiting: no  Abdominal pain: no  History:   History of frequent UTI s: no  History of kidney stones: no  History of hernias: no  Personal or Family history of Prostate problems: no  Sexually active: YES  Precipitating or alleviating factors: None  Therapies tried and outcome: None    Review of Systems   Constitutional, HEENT, cardiovascular, pulmonary, gi and gu systems are negative, except as otherwise noted.      Objective    /82   Pulse 74   Temp 97.4  F (36.3  C) (Tympanic)   Resp 14   Ht 1.69 m (5' 6.54\")   Wt 95.4 kg (210 lb 6.4 oz)   SpO2 100%   BMI 33.42 kg/m    Body mass index is 33.42 kg/m .  Physical Exam  Vitals and nursing note reviewed.   Constitutional:       General: He is not in acute distress.     Appearance: He is not ill-appearing or diaphoretic.   HENT:      Head: Normocephalic and atraumatic.      Mouth/Throat:      Mouth: Mucous membranes are moist.   Eyes:      Conjunctiva/sclera: Conjunctivae normal.   Cardiovascular:      Rate and Rhythm: Normal rate and regular rhythm.      Heart sounds: Normal heart sounds. No murmur heard.    No friction rub. No gallop.   Pulmonary:      Effort: Pulmonary effort is normal. No respiratory distress.      Breath sounds: Normal breath sounds. No stridor. No wheezing, rhonchi or rales.   Abdominal:      General: Bowel sounds are normal. There is no distension.      Palpations: Abdomen is soft. There is no mass.      Tenderness: There is no abdominal tenderness. There is no right CVA tenderness, left CVA tenderness, guarding or rebound.      " Hernia: No hernia is present.   Skin:     General: Skin is warm and dry.   Neurological:      General: No focal deficit present.      Mental Status: He is alert. Mental status is at baseline.   Psychiatric:         Mood and Affect: Mood normal.         Behavior: Behavior normal.          Results for orders placed or performed in visit on 02/28/22   UA macro with reflex to Microscopic and Culture - Clinc Collect     Status: Abnormal    Specimen: Urine, Midstream   Result Value Ref Range    Color Urine Yellow Colorless, Straw, Light Yellow, Yellow    Appearance Urine Clear Clear    Glucose Urine Negative Negative mg/dL    Bilirubin Urine Negative Negative    Ketones Urine Trace (A) Negative mg/dL    Specific Gravity Urine 1.025 1.003 - 1.035    Blood Urine Negative Negative    pH Urine 7.0 5.0 - 7.0    Protein Albumin Urine Negative Negative mg/dL    Urobilinogen Urine 0.2 0.2, 1.0 E.U./dL    Nitrite Urine Negative Negative    Leukocyte Esterase Urine Negative Negative    Narrative    Microscopic not indicated   PSA, screen     Status: Normal   Result Value Ref Range    Prostate Specific Antigen Screen 0.74 0.00 - 4.00 ug/L   Urine Culture Aerobic Bacterial - lab collect     Status: None    Specimen: Urine, Midstream   Result Value Ref Range    Culture No Growth

## 2022-02-28 NOTE — PATIENT INSTRUCTIONS
Leroy Riley,    Thank you for allowing [facility] to manage your care.    I am unsure of the cause of your symptoms, but we will see what our workup shows. Your symptoms could simply be from dehydration.    If you develop worsening/changing symptoms at any time, please call 911 or go to the emergency department for evaluation.    I ordered some lab work, please go to the laboratory to get your studies.    Drink 8-10 glasses of fluid daily to stay well-hydrated.    For your pain, please use Ibuprofen 400mg four times daily with food. Between ibuprofen doses, you may use Tylenol 650mg.     Max acetaminophen (Tylenol) 4,000mg/24 hours  Max ibuprofen 3,200mg/24 hours    Please allow 1-2 business days for our office to contact you in regards to your laboratory/radiological studies.  If not done so, I encourage you to login into [employer] [email or web address] to review your results as well.     If you have any questions or concerns, please feel free to call us at [phone]    Sincerely,    Osvaldo Del Rio PA-C    Did you know?      You can schedule a video visit for follow-up appointments as well as future appointments for certain conditions.  Please see the below link.     [email or web address]    If you have not already done so,  I encourage you to sign up for [employer] ([email or web address]).  This will allow you to review your results, securely communicate with a provider, and schedule virtual visits as well.

## 2022-03-01 DIAGNOSIS — G43.709 CHRONIC MIGRAINE WITHOUT AURA WITHOUT STATUS MIGRAINOSUS, NOT INTRACTABLE: Primary | ICD-10-CM

## 2022-03-01 RX ORDER — TIZANIDINE 2 MG/1
2 TABLET ORAL
Qty: 10 TABLET | Refills: 3 | Status: SHIPPED | OUTPATIENT
Start: 2022-03-01 | End: 2022-12-07

## 2022-03-02 LAB — BACTERIA UR CULT: NO GROWTH

## 2022-06-17 ENCOUNTER — OFFICE VISIT (OUTPATIENT)
Dept: ORTHOPEDICS | Facility: CLINIC | Age: 42
End: 2022-06-17

## 2022-06-17 ENCOUNTER — ANCILLARY PROCEDURE (OUTPATIENT)
Dept: GENERAL RADIOLOGY | Facility: CLINIC | Age: 42
End: 2022-06-17
Attending: FAMILY MEDICINE
Payer: COMMERCIAL

## 2022-06-17 VITALS
DIASTOLIC BLOOD PRESSURE: 79 MMHG | WEIGHT: 195 LBS | HEART RATE: 76 BPM | SYSTOLIC BLOOD PRESSURE: 129 MMHG | BODY MASS INDEX: 30.97 KG/M2

## 2022-06-17 DIAGNOSIS — M77.41 METATARSALGIA OF BOTH FEET: ICD-10-CM

## 2022-06-17 DIAGNOSIS — M54.2 NECK PAIN: ICD-10-CM

## 2022-06-17 DIAGNOSIS — M77.42 METATARSALGIA OF BOTH FEET: ICD-10-CM

## 2022-06-17 DIAGNOSIS — M54.2 PAIN OF CERVICAL FACET JOINT: ICD-10-CM

## 2022-06-17 DIAGNOSIS — M54.2 CERVICALGIA: ICD-10-CM

## 2022-06-17 DIAGNOSIS — M25.579 PAIN IN JOINT, ANKLE AND FOOT: ICD-10-CM

## 2022-06-17 DIAGNOSIS — M21.6X2 PRONATION OF BOTH FEET: ICD-10-CM

## 2022-06-17 DIAGNOSIS — M21.6X1 PRONATION OF BOTH FEET: ICD-10-CM

## 2022-06-17 DIAGNOSIS — M25.579 PAIN IN JOINT INVOLVING ANKLE AND FOOT, UNSPECIFIED LATERALITY: Primary | ICD-10-CM

## 2022-06-17 PROCEDURE — 99214 OFFICE O/P EST MOD 30 MIN: CPT | Performed by: FAMILY MEDICINE

## 2022-06-17 PROCEDURE — 73630 X-RAY EXAM OF FOOT: CPT | Mod: TC | Performed by: RADIOLOGY

## 2022-06-17 RX ORDER — PREDNISONE 20 MG/1
40 TABLET ORAL DAILY
Qty: 10 TABLET | Refills: 0 | Status: SHIPPED | OUTPATIENT
Start: 2022-06-17 | End: 2022-09-21

## 2022-06-17 ASSESSMENT — PAIN SCALES - GENERAL: PAINLEVEL: MILD PAIN (3)

## 2022-06-17 NOTE — PROGRESS NOTES
Chintan Niño  :  1980  DOS: 2022  MRN: [id number]    Sports Medicine Clinic Visit    PCP: Jairo Hung    Chintan Niño is a 42 year old male who is seen as a self referral presenting with bilateral foot pain.    Injury: For the past 1.5 months, has noticed bilateral plantar foot pain the day after prolonged walking. Also, has painful 1st MTP joints. Has a history of shin splints. Has tried ice packs without symptom relief.  Notes that pain is the worst in the morning. Other evaluation and/or treatments so far consists of: Ice.  Prior imaging: No recent imaging completed.  Prior History of related problems: no    Severe base of the skull neck pain. Has been seeing a chiropractor and he notes no relief in pain. Most painful with axial loading. Non-radiating pain in the right neck.    Social History: Insurance and construction oversite    Review of Systems  Musculoskeletal: as above  Remainder of review of systems is negative including constitutional, CV, pulmonary, GI, Skin and Neurologic except as noted in HPI or medical history.    Past Medical History:   Diagnosis Date     GERD (gastroesophageal reflux disease) 2011     Past Surgical History:   Procedure Laterality Date     APPENDECTOMY OPEN CHILD       ENHANCE LASER REFRACTIVE BILATERAL EXISTING PT IN PARAMETERS       Family History   Problem Relation Age of Onset     Diabetes Mother      Retinal detachment Mother      Cancer Father         Bone cancer     Glaucoma Father      Cerebrovascular Disease Maternal Grandmother      Asthma No family hx of      C.A.D. No family hx of      Hypertension No family hx of      Breast Cancer No family hx of      Cancer - colorectal No family hx of      Prostate Cancer No family hx of      Macular Degeneration No family hx of        Objective  /79   Pulse 76   Wt 88.5 kg (195 lb)   BMI 30.97 kg/m        General: healthy, alert and in no distress      HEENT: no scleral icterus or conjunctival  erythema     Skin: no suspicious lesions or rash. No jaundice.     CV: regular rhythm by palpation, 2+ distal pulses, no pedal edema      Resp: normal respiratory effort without conversational dyspnea     Psych: normal mood and affect      Gait: mildly antalgic, appropriate coordination and balance     Neuro: normal light touch sensory exam of the extremities. Motor strength as noted below     Bilateral Ankle/Foot Exam:    Inspection:       no visible ecchymosis       no visible edema or effusion       Normal DP artery pulse       Normal PT artery pulse    Foot inspection:       no deformity noted, some pronation with ambulation    ROM:        full ROM with dorsiflexion, plantarflexion, inversion and eversion    Tender:       1st-3rd MT base, mild over plantar fascia    Non-Tender:       remainder of foot and ankle       lateral malleolus       lateral ankle ligaments       deltoid ligament       posterior edge of lateral malleolus       proximal 5th metatarsal       dorsal tibiotalar joint       tarsal navicular       medial malleolus       distal tibiofibular joint       tibialis posterior tendon, posterior to medial malleolus       peroneal tendon sheath    Skin:       well perfused       capillary refill less than 2 seconds    Special Tests:       neg (-) anterior drawer        neg (-) talar tilt        neg (-) external rotation testing     Gait:       Mildly antalgic gait especially when initiating ambulation after sitting    Proprioception:        Deferred    Cervical spine Exam  Inspection: normal cervical lordosis  Tender:  medial border of scapula, superior angle of scapula, left upper paracervical muscles, right upper paracervical muscles, left trapezius muscles, right trapezius muscles  Non-tender:  spinous processes  Range of Motion:  Full ROM of cervical spine, pain with most movements, worst with extension combined with rotation  Strength: Full strength of all neck muscles  Special tests:  Spurling's -  negative - left, Spurling's - negative - right, neg adson's, mild pain with facet compression      Radiology:  Recent Results (from the past 744 hour(s))   XR Cervical Spine 2/3 vws    Narrative    XR CERVICAL SPINE 2/3 VWS 6/17/2022 2:59 PM     COMPARISON: None    HISTORY: Neck pain    FINDINGS: Normal vertebral body heights. Normal alignment. Mild C3-4  interbody degeneration. No advanced facet arthropathy. Normal  visualized lung apices.    RAINE CAM MD         SYSTEM ID:  [id number]   XR Foot Bilateral G/E 3 Views    Narrative    XR FOOT BILATERAL G/E 3 VIEWS 6/17/2022 2:59 PM     HISTORY: Pain in joint, ankle and foot    COMPARISON: None.       Impression    IMPRESSION: Normal joint spaces and alignment. No fracture on either  side.    HUSSAIN SALDIVAR MD         SYSTEM ID:  [id number]       Assessment:  1. Pain in joint involving ankle and foot, unspecified laterality    2. Neck pain    3. Pain of cervical facet joint    4. Cervicalgia    5. Metatarsalgia of both feet    6. Pronation of both feet        Plan:  Discussed the assessment with the patient.  Follow up: 2 weeks prn based on progress  Multiple locations of pain/inflammation, most focal over b/l MT heads, reviewed foot mechanics, some pronation of both feet with ambulation, trial of Superfeet, felt good while using in office  Consider MT pads vs Dynaflex prn  Lower impact/activity strategies reviewed  XR images independently visualized and reviewed with patient today in clinic  Referral to podiatry available prn  Calf/LE stretching/flexibility focus reviewed  Acute on chronic cervical pain, reviewed posture, ergonomics, activity modification  PT ordered for acute therapies and HEP development  Could consider advanced imaging in the future prn, defer for now, no clear radicular sx on exam or by hx  Stretching program reviewed  Given multiple locations of pain reviewed option for one-time trial of oral steroid burst, reviewed risks and limited  diagnostic information, he was in agreement with the plan today  Home handouts provided and supportive care reviewed  All questions were answered today  Contact us with additional questions or concerns  Signs and sx of concern reviewed      Mike Dave DO, DEVENDRA  Sports Medicine Physician  [facility]      Time spent in chart review, one-on-one evaluation, discussion with patient regarding: nature of problem, clinical course, prior treatments, therapeutic options, shared-decision making, potential procedures and referrals, and charting related to the visit: 33 minutes.  If applicable, time does not include time spent performing any procedure.

## 2022-06-17 NOTE — LETTER
2022         RE: Chintan Niño  [address]  [place] [address]        Dear Colleague,    Thank you for referring your patient, Chintan Niño, to the [facility] [place]. Please see a copy of my visit note below.    Chintan Niño  :  1980  DOS: 2022  MRN: [id number]    Sports Medicine Clinic Visit    PCP: Jairo Hung    Chintan Niño is a 42 year old male who is seen as a self referral presenting with bilateral foot pain.    Injury: For the past 1.5 months, has noticed bilateral plantar foot pain the day after prolonged walking. Also, has painful 1st MTP joints. Has a history of shin splints. Has tried ice packs without symptom relief.  Notes that pain is the worst in the morning. Other evaluation and/or treatments so far consists of: Ice.  Prior imaging: No recent imaging completed.  Prior History of related problems: no    Severe base of the skull neck pain. Has been seeing a chiropractor and he notes no relief in pain. Most painful with axial loading. Non-radiating pain in the right neck.    Social History: Insurance and construction oversite    Review of Systems  Musculoskeletal: as above  Remainder of review of systems is negative including constitutional, CV, pulmonary, GI, Skin and Neurologic except as noted in HPI or medical history.    Past Medical History:   Diagnosis Date     GERD (gastroesophageal reflux disease) 2011     Past Surgical History:   Procedure Laterality Date     APPENDECTOMY OPEN CHILD       ENHANCE LASER REFRACTIVE BILATERAL EXISTING PT IN PARAMETERS       Family History   Problem Relation Age of Onset     Diabetes Mother      Retinal detachment Mother      Cancer Father         Bone cancer     Glaucoma Father      Cerebrovascular Disease Maternal Grandmother      Asthma No family hx of      C.A.D. No family hx of      Hypertension No family hx of      Breast Cancer No family hx of      Cancer - colorectal No family  hx of      Prostate Cancer No family hx of      Macular Degeneration No family hx of        Objective  /79   Pulse 76   Wt 88.5 kg (195 lb)   BMI 30.97 kg/m        General: healthy, alert and in no distress      HEENT: no scleral icterus or conjunctival erythema     Skin: no suspicious lesions or rash. No jaundice.     CV: regular rhythm by palpation, 2+ distal pulses, no pedal edema      Resp: normal respiratory effort without conversational dyspnea     Psych: normal mood and affect      Gait: mildly antalgic, appropriate coordination and balance     Neuro: normal light touch sensory exam of the extremities. Motor strength as noted below     Bilateral Ankle/Foot Exam:    Inspection:       no visible ecchymosis       no visible edema or effusion       Normal DP artery pulse       Normal PT artery pulse    Foot inspection:       no deformity noted, some pronation with ambulation    ROM:        full ROM with dorsiflexion, plantarflexion, inversion and eversion    Tender:       1st-3rd MT base, mild over plantar fascia    Non-Tender:       remainder of foot and ankle       lateral malleolus       lateral ankle ligaments       deltoid ligament       posterior edge of lateral malleolus       proximal 5th metatarsal       dorsal tibiotalar joint       tarsal navicular       medial malleolus       distal tibiofibular joint       tibialis posterior tendon, posterior to medial malleolus       peroneal tendon sheath    Skin:       well perfused       capillary refill less than 2 seconds    Special Tests:       neg (-) anterior drawer        neg (-) talar tilt        neg (-) external rotation testing     Gait:       Mildly antalgic gait especially when initiating ambulation after sitting    Proprioception:        Deferred    Cervical spine Exam  Inspection: normal cervical lordosis  Tender:  medial border of scapula, superior angle of scapula, left upper paracervical muscles, right upper paracervical muscles, left  trapezius muscles, right trapezius muscles  Non-tender:  spinous processes  Range of Motion:  Full ROM of cervical spine, pain with most movements, worst with extension combined with rotation  Strength: Full strength of all neck muscles  Special tests:  Spurling's - negative - left, Spurling's - negative - right, neg adson's, mild pain with facet compression      Radiology:  Recent Results (from the past 744 hour(s))   XR Cervical Spine 2/3 vws    Narrative    XR CERVICAL SPINE 2/3 VWS 6/17/2022 2:59 PM     COMPARISON: None    HISTORY: Neck pain    FINDINGS: Normal vertebral body heights. Normal alignment. Mild C3-4  interbody degeneration. No advanced facet arthropathy. Normal  visualized lung apices.    RAINE CAM MD         SYSTEM ID:  [id number]   XR Foot Bilateral G/E 3 Views    Narrative    XR FOOT BILATERAL G/E 3 VIEWS 6/17/2022 2:59 PM     HISTORY: Pain in joint, ankle and foot    COMPARISON: None.       Impression    IMPRESSION: Normal joint spaces and alignment. No fracture on either  side.    HUSSAIN SALDIVAR MD         SYSTEM ID:  [id number]       Assessment:  1. Pain in joint involving ankle and foot, unspecified laterality    2. Neck pain    3. Pain of cervical facet joint    4. Cervicalgia    5. Metatarsalgia of both feet    6. Pronation of both feet        Plan:  Discussed the assessment with the patient.  Follow up: 2 weeks prn based on progress  Multiple locations of pain/inflammation, most focal over b/l MT heads, reviewed foot mechanics, some pronation of both feet with ambulation, trial of Superfeet, felt good while using in office  Consider MT pads vs Dynaflex prn  Lower impact/activity strategies reviewed  XR images independently visualized and reviewed with patient today in clinic  Referral to podiatry available prn  Calf/LE stretching/flexibility focus reviewed  Acute on chronic cervical pain, reviewed posture, ergonomics, activity modification  PT ordered for acute therapies and HEP  development  Could consider advanced imaging in the future prn, defer for now, no clear radicular sx on exam or by hx  Stretching program reviewed  Given multiple locations of pain reviewed option for one-time trial of oral steroid burst, reviewed risks and limited diagnostic information, he was in agreement with the plan today  Home handouts provided and supportive care reviewed  All questions were answered today  Contact us with additional questions or concerns  Signs and sx of concern reviewed      Mike Dave DO, DEVENDRA  Sports Medicine Physician  [facility]      Time spent in chart review, one-on-one evaluation, discussion with patient regarding: nature of problem, clinical course, prior treatments, therapeutic options, shared-decision making, potential procedures and referrals, and charting related to the visit: 33 minutes.  If applicable, time does not include time spent performing any procedure.            Again, thank you for allowing me to participate in the care of your patient.        Sincerely,        Mike Dave DO

## 2022-09-03 ENCOUNTER — HEALTH MAINTENANCE LETTER (OUTPATIENT)
Age: 42
End: 2022-09-03

## 2022-09-21 ENCOUNTER — OFFICE VISIT (OUTPATIENT)
Dept: FAMILY MEDICINE | Facility: CLINIC | Age: 42
End: 2022-09-21
Payer: COMMERCIAL

## 2022-09-21 VITALS
HEIGHT: 67 IN | SYSTOLIC BLOOD PRESSURE: 108 MMHG | BODY MASS INDEX: 31.59 KG/M2 | TEMPERATURE: 97 F | OXYGEN SATURATION: 98 % | WEIGHT: 201.25 LBS | HEART RATE: 77 BPM | RESPIRATION RATE: 16 BRPM | DIASTOLIC BLOOD PRESSURE: 69 MMHG

## 2022-09-21 DIAGNOSIS — R22.9 LOCALIZED SKIN MASS, LUMP, OR SWELLING: Primary | ICD-10-CM

## 2022-09-21 DIAGNOSIS — R07.89 ATYPICAL CHEST PAIN: ICD-10-CM

## 2022-09-21 LAB
ERYTHROCYTE [DISTWIDTH] IN BLOOD BY AUTOMATED COUNT: 12.5 % (ref 10–15)
HCT VFR BLD AUTO: 44 % (ref 40–53)
HGB BLD-MCNC: 14.9 G/DL (ref 13.3–17.7)
MCH RBC QN AUTO: 31.2 PG (ref 26.5–33)
MCHC RBC AUTO-ENTMCNC: 33.9 G/DL (ref 31.5–36.5)
MCV RBC AUTO: 92 FL (ref 78–100)
PLATELET # BLD AUTO: 159 10E3/UL (ref 150–450)
RBC # BLD AUTO: 4.78 10E6/UL (ref 4.4–5.9)
WBC # BLD AUTO: 6.3 10E3/UL (ref 4–11)

## 2022-09-21 PROCEDURE — 99214 OFFICE O/P EST MOD 30 MIN: CPT | Performed by: NURSE PRACTITIONER

## 2022-09-21 PROCEDURE — 80048 BASIC METABOLIC PNL TOTAL CA: CPT | Performed by: NURSE PRACTITIONER

## 2022-09-21 PROCEDURE — 36415 COLL VENOUS BLD VENIPUNCTURE: CPT | Performed by: NURSE PRACTITIONER

## 2022-09-21 PROCEDURE — 85027 COMPLETE CBC AUTOMATED: CPT | Performed by: NURSE PRACTITIONER

## 2022-09-21 PROCEDURE — 84443 ASSAY THYROID STIM HORMONE: CPT | Performed by: NURSE PRACTITIONER

## 2022-09-21 ASSESSMENT — PAIN SCALES - GENERAL: PAINLEVEL: EXTREME PAIN (8)
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
## 2022-09-22 LAB
ANION GAP SERPL CALCULATED.3IONS-SCNC: 3 MMOL/L (ref 3–14)
BUN SERPL-MCNC: 14 MG/DL (ref 7–30)
CALCIUM SERPL-MCNC: 8.2 MG/DL (ref 8.5–10.1)
CHLORIDE BLD-SCNC: 108 MMOL/L (ref 94–109)
CO2 SERPL-SCNC: 28 MMOL/L (ref 20–32)
CREAT SERPL-MCNC: 1.3 MG/DL (ref 0.66–1.25)
GFR SERPL CREATININE-BSD FRML MDRD: 69 ML/MIN/1.73M2
GLUCOSE BLD-MCNC: 112 MG/DL (ref 70–99)
POTASSIUM BLD-SCNC: 4.2 MMOL/L (ref 3.4–5.3)
SODIUM SERPL-SCNC: 139 MMOL/L (ref 133–144)
TSH SERPL DL<=0.005 MIU/L-ACNC: 1.74 MU/L (ref 0.4–4)

## 2022-09-27 ENCOUNTER — ANCILLARY PROCEDURE (OUTPATIENT)
Dept: ULTRASOUND IMAGING | Facility: CLINIC | Age: 42
End: 2022-09-27
Attending: NURSE PRACTITIONER
Payer: COMMERCIAL

## 2022-09-27 DIAGNOSIS — R22.9 LOCALIZED SKIN MASS, LUMP, OR SWELLING: ICD-10-CM

## 2022-09-27 PROCEDURE — 76882 US LMTD JT/FCL EVL NVASC XTR: CPT | Mod: TC | Performed by: RADIOLOGY

## 2022-10-07 ENCOUNTER — ANCILLARY PROCEDURE (OUTPATIENT)
Dept: CARDIOLOGY | Facility: CLINIC | Age: 42
End: 2022-10-07
Attending: NURSE PRACTITIONER
Payer: COMMERCIAL

## 2022-10-07 DIAGNOSIS — R07.89 ATYPICAL CHEST PAIN: ICD-10-CM

## 2022-10-07 PROCEDURE — 93016 CV STRESS TEST SUPVJ ONLY: CPT | Performed by: INTERNAL MEDICINE

## 2022-10-07 PROCEDURE — 93321 DOPPLER ECHO F-UP/LMTD STD: CPT | Performed by: STUDENT IN AN ORGANIZED HEALTH CARE EDUCATION/TRAINING PROGRAM

## 2022-10-07 PROCEDURE — 93350 STRESS TTE ONLY: CPT | Performed by: STUDENT IN AN ORGANIZED HEALTH CARE EDUCATION/TRAINING PROGRAM

## 2022-10-07 PROCEDURE — 93325 DOPPLER ECHO COLOR FLOW MAPG: CPT | Performed by: STUDENT IN AN ORGANIZED HEALTH CARE EDUCATION/TRAINING PROGRAM

## 2022-10-07 PROCEDURE — 93018 CV STRESS TEST I&R ONLY: CPT | Performed by: STUDENT IN AN ORGANIZED HEALTH CARE EDUCATION/TRAINING PROGRAM

## 2022-10-07 PROCEDURE — 93017 CV STRESS TEST TRACING ONLY: CPT | Performed by: STUDENT IN AN ORGANIZED HEALTH CARE EDUCATION/TRAINING PROGRAM

## 2022-10-07 RX ADMIN — Medication 5 ML: at 14:45

## 2022-12-07 ENCOUNTER — VIRTUAL VISIT (OUTPATIENT)
Dept: NEUROLOGY | Facility: CLINIC | Age: 42
End: 2022-12-07
Payer: COMMERCIAL

## 2022-12-07 DIAGNOSIS — G43.709 CHRONIC MIGRAINE WITHOUT AURA WITHOUT STATUS MIGRAINOSUS, NOT INTRACTABLE: ICD-10-CM

## 2022-12-07 PROCEDURE — 99213 OFFICE O/P EST LOW 20 MIN: CPT | Mod: GT | Performed by: PSYCHIATRY & NEUROLOGY

## 2022-12-07 RX ORDER — TIZANIDINE 2 MG/1
2 TABLET ORAL
Qty: 10 TABLET | Refills: 3 | Status: SHIPPED | OUTPATIENT
Start: 2022-12-07

## 2022-12-07 RX ORDER — ZOLMITRIPTAN 5 MG/1
1 SPRAY NASAL
Qty: 9 EACH | Refills: 11 | Status: SHIPPED | OUTPATIENT
Start: 2022-12-07 | End: 2023-12-13

## 2022-12-07 RX ORDER — ONDANSETRON 4 MG/1
4 TABLET, FILM COATED ORAL EVERY 8 HOURS PRN
Qty: 20 TABLET | Refills: 11 | Status: SHIPPED | OUTPATIENT
Start: 2022-12-07 | End: 2023-03-08

## 2022-12-07 RX ORDER — NAPROXEN 500 MG/1
500 TABLET ORAL 2 TIMES DAILY PRN
Qty: 28 TABLET | Refills: 11 | Status: SHIPPED | OUTPATIENT
Start: 2022-12-07

## 2022-12-07 ASSESSMENT — HEADACHE IMPACT TEST (HIT 6)
HOW OFTEN DO HEADACHES LIMIT YOUR DAILY ACTIVITIES: RARELY
WHEN YOU HAVE A HEADACHE HOW OFTEN DO YOU WISH YOU COULD LIE DOWN: ALWAYS
HOW OFTEN DID HEADACHS LIMIT CONCENTRATION ON WORK OR DAILY ACTIVITY: RARELY
HOW OFTEN DID HEADACHS LIMIT CONCENTRATION ON WORK OR DAILY ACTIVITY: RARELY
HOW OFTEN HAVE YOU FELT TOO TIRED TO WORK BECAUSE OF YOUR HEADACHES: RARELY
HOW OFTEN DO HEADACHES LIMIT YOUR DAILY ACTIVITIES: RARELY
WHEN YOU HAVE HEADACHES HOW OFTEN IS THE PAIN SEVERE: SOMETIMES
HOW OFTEN HAVE YOU FELT FED UP OR IRRITATED BECAUSE OF YOUR HEADACHES: NEVER
HIT6 TOTAL SCORE: 53
WHEN YOU HAVE A HEADACHE HOW OFTEN DO YOU WISH YOU COULD LIE DOWN: ALWAYS
HOW OFTEN HAVE YOU FELT TOO TIRED TO WORK BECAUSE OF YOUR HEADACHES: RARELY
HIT6 TOTAL SCORE: 53
WHEN YOU HAVE HEADACHES HOW OFTEN IS THE PAIN SEVERE: SOMETIMES
HOW OFTEN HAVE YOU FELT FED UP OR IRRITATED BECAUSE OF YOUR HEADACHES: NEVER

## 2022-12-07 ASSESSMENT — MIGRAINE DISABILITY ASSESSMENT (MIDAS)
HOW MANY DAYS DID YOU MISS WORK OR SCHOOL BECAUSE OF HEADACHES: 0
HOW MANY DAYS DID YOU NOT DO HOUSEWORK BECAUSE OF HEADACHES: 0
TOTAL SCORE: 0
HOW MANY DAYS WAS YOUR PRODUCTIVITY CUT IN HALF BECAUSE OF HEADACHES: 0
HOW OFTEN WERE SOCIAL ACTIVITIES MISSED DUE TO HEADACHES: 0
HOW MANY DAYS IN THE PAST 3 MONTHS HAVE YOU HAD A HEADACHE: 9
HOW MANY DAYS WAS HOUSEWORK PRODUCTIVITY CUT IN HALF DUE TO HEADACHES: 0
ON A SCALE FROM 0-10 ON AVERAGE HOW PAINFUL WERE HEADACHES: 8

## 2022-12-07 NOTE — LETTER
12/7/2022       RE: Chintan Niño  [address]  [place] [address]     Dear Colleague,    Thank you for referring your patient, Chintan Niño, to the [facility] [place] at [facility]. Please see a copy of my visit note below.    [facility]    Headache Neurology Progress Note  December 7, 2022    Subjective:    Chintan Niño returns for follow up of     He currently has 3/30 headache days per month, with 1-2/30 severe headache days per month.    Zolmitriptan NS is effective. Excedrin can be helpful as needed.    Emgality remains effective. He notices it wearing off in the week before.     He denies side effects.    Objective:    Vitals: There were no vitals taken for this visit.  General: Cooperative, NAD  Neurologic:  Mental Status: Fully alert, attentive and oriented. Speech clear and fluent.   Cranial Nerves: Facial movements symmetric.   Motor: No abnormal movements.      Assessment/Plan:   Chintan Niño is a 42 year old man who presents for follow-up of chronic migraine without aura.  He remains well controlled on Emgality monthly. He has had a significant reduction in headache days with Emgality, reducing from 20 severe headache days a month to 1-3 headache day per month.     I recommend he continue Emgality subcutaneous injection every 28 days.  -In the future, if Emgality is no longer effective, then could try botulinum toxin injections at that time.     For acute treatment, zolmitriptan nasal spray is more effective than sumatriptan nasal spray.    -For acute treatment of mild headache, I recommend naproxen 500 mg as needed, not to exceed more than 14 days/month to avoid medication overuse.  -For acute treatment of moderate to severe headache, he will continue zolmitriptan nasal spray. This is also to be taken at the onset of headache, and may repeat in 2 hours if needed.  This is not to exceed  more than 9 days/month to avoid medication overuse.    -For nausea related to headache, he has prescription for ondansetron 4 mg tablets to be used as needed.  -Should the above measures result in insufficient relief or are not tolerated, could consider ketorolac nasal spray.  Also could pursue alternative brand of sumatriptan nasal spray.    I will plan to see him back in 1 year, or sooner if needed.    Kelin Ruiz MD  Neurology

## 2022-12-07 NOTE — PROGRESS NOTES
Osvaldo is a 42 year old who is being evaluated via a billable video visit.      How would you like to obtain your AVS? MyChart  If the video visit is dropped, the invitation should be resent by: Text to cell phone: [phone]  Will anyone else be joining your video visit? No        Video-Visit Details    Video Start Time: 10:03 AM    Type of service:  Video Visit    Video End Time:10:20am    Originating Location (pt. Location): Home    Distant Location (provider location):  Off-site    Platform used for Video Visit: [facility]    Headache Neurology Progress Note  December 7, 2022    Subjective:    Chintan Niño returns for follow up of     He currently has 3/30 headache days per month, with 1-2/30 severe headache days per month.    Zolmitriptan NS is effective. Excedrin can be helpful as needed.    Emgality remains effective. He notices it wearing off in the week before.     He denies side effects.    Objective:    Vitals: There were no vitals taken for this visit.  General: Cooperative, NAD  Neurologic:  Mental Status: Fully alert, attentive and oriented. Speech clear and fluent.   Cranial Nerves: Facial movements symmetric.   Motor: No abnormal movements.      Assessment/Plan:   Chintan Niño is a 42 year old man who presents for follow-up of chronic migraine without aura.  He remains well controlled on Emgality monthly. He has had a significant reduction in headache days with Emgality, reducing from 20 severe headache days a month to 1-3 headache day per month.     I recommend he continue Emgality subcutaneous injection every 28 days.  -In the future, if Emgality is no longer effective, then could try botulinum toxin injections at that time.     For acute treatment, zolmitriptan nasal spray is more effective than sumatriptan nasal spray.    -For acute treatment of mild headache, I recommend naproxen 500 mg as needed, not to exceed more than 14 days/month to avoid  medication overuse.  -For acute treatment of moderate to severe headache, he will continue zolmitriptan nasal spray. This is also to be taken at the onset of headache, and may repeat in 2 hours if needed.  This is not to exceed more than 9 days/month to avoid medication overuse.    -For nausea related to headache, he has prescription for ondansetron 4 mg tablets to be used as needed.  -Should the above measures result in insufficient relief or are not tolerated, could consider ketorolac nasal spray.  Also could pursue alternative brand of sumatriptan nasal spray.    I will plan to see him back in 1 year, or sooner if needed.    Kelin Ruiz MD  Neurology

## 2022-12-15 ENCOUNTER — TELEPHONE (OUTPATIENT)
Dept: NEUROLOGY | Facility: CLINIC | Age: 42
End: 2022-12-15

## 2022-12-15 NOTE — TELEPHONE ENCOUNTER
Central Prior Authorization Team   Phone: [phone]    PA Initiation    Medication: galcanezumab-gnlm (EMGALITY) 120 MG/ML injection  Insurance Company: Level - Phone [phone] Fax [phone]  Pharmacy Filling the Rx: [id number] [place] [id number] AT [facility]  [address]  Filling Pharmacy Phone: [phone]  Filling Pharmacy Fax: [phone]  Start Date: 12/15/2022

## 2022-12-15 NOTE — TELEPHONE ENCOUNTER
Prior Authorization Retail Medication Request     Medication/Dose: galcanezumab-gnlm (EMGALITY) 120 MG/ML injection; Inject 1 mL (120 mg) Subcutaneous every 28 days - Subcutaneous; Inject 2 mLs (240 mg) Subcutaneous once for 1 dose - Subcutaneous  ICD code (if different than what is on RX):  G43.709  Previously Tried and Failed:  He previously took venlafaxine, amitriptyline, bupropion, topiramate,  Rationale:  Chronic migraine 20-30 headache days a month.      Insurance Name:  [employer]  Insurance ID:  [id number]         Pharmacy Information (if different than what is on RX)  Name:    Phone:

## 2022-12-19 NOTE — TELEPHONE ENCOUNTER
Prior Authorization Not Needed per Insurance    Medication: galcanezumab-gnlm (EMGALITY) 120 MG/ML injection-PA NOT NEEDED   Insurance Company: AT [employer] - Phone [phone] Fax [phone]  Expected CoPay:      Pharmacy Filling the Rx: [id number] [id number] AT [facility] [address]  Pharmacy Notified: Yes  Patient Notified: No    Insurance states that PA is Not Needed and medication is covered. Prior PA Approval on file with Effective Dates: 6/1/2022-7/1/2023. Called pharmacy and pharmacy stated that currently medication is refill too soon. Next available fill date is 12/22/2022.

## 2023-01-04 DIAGNOSIS — G43.709 CHRONIC MIGRAINE WITHOUT AURA WITHOUT STATUS MIGRAINOSUS, NOT INTRACTABLE: ICD-10-CM

## 2023-03-08 ENCOUNTER — OFFICE VISIT (OUTPATIENT)
Dept: FAMILY MEDICINE | Facility: CLINIC | Age: 43
End: 2023-03-08
Payer: COMMERCIAL

## 2023-03-08 VITALS
HEART RATE: 74 BPM | BODY MASS INDEX: 30.92 KG/M2 | TEMPERATURE: 97.7 F | WEIGHT: 204 LBS | OXYGEN SATURATION: 97 % | SYSTOLIC BLOOD PRESSURE: 120 MMHG | HEIGHT: 68 IN | DIASTOLIC BLOOD PRESSURE: 82 MMHG

## 2023-03-08 DIAGNOSIS — R35.0 FREQUENT URINATION: Primary | ICD-10-CM

## 2023-03-08 PROBLEM — B35.6 TINEA CRURIS: Status: RESOLVED | Noted: 2019-11-15 | Resolved: 2023-03-08

## 2023-03-08 PROBLEM — E66.811 CLASS 1 DRUG-INDUCED OBESITY WITHOUT SERIOUS COMORBIDITY WITH BODY MASS INDEX (BMI) OF 32.0 TO 32.9 IN ADULT: Status: RESOLVED | Noted: 2020-10-28 | Resolved: 2023-03-08

## 2023-03-08 PROBLEM — E66.1 CLASS 1 DRUG-INDUCED OBESITY WITHOUT SERIOUS COMORBIDITY WITH BODY MASS INDEX (BMI) OF 32.0 TO 32.9 IN ADULT: Status: RESOLVED | Noted: 2020-10-28 | Resolved: 2023-03-08

## 2023-03-08 PROBLEM — E66.9 OBESITY: Status: ACTIVE | Noted: 2023-03-08

## 2023-03-08 PROBLEM — N53.19 OTHER EJACULATORY DYSFUNCTION: Status: RESOLVED | Noted: 2020-05-12 | Resolved: 2023-03-08

## 2023-03-08 PROBLEM — K60.2 ANAL FISSURE: Status: RESOLVED | Noted: 2019-11-15 | Resolved: 2023-03-08

## 2023-03-08 PROBLEM — Z72.0 TOBACCO ABUSE: Status: RESOLVED | Noted: 2019-11-15 | Resolved: 2023-03-08

## 2023-03-08 LAB
ALBUMIN UR-MCNC: NEGATIVE MG/DL
APPEARANCE UR: CLEAR
BACTERIA #/AREA URNS HPF: NORMAL /HPF
BILIRUB UR QL STRIP: NEGATIVE
COLOR UR AUTO: YELLOW
GLUCOSE UR STRIP-MCNC: NEGATIVE MG/DL
HGB UR QL STRIP: NEGATIVE
KETONES UR STRIP-MCNC: NEGATIVE MG/DL
LEUKOCYTE ESTERASE UR QL STRIP: NEGATIVE
NITRATE UR QL: NEGATIVE
PH UR STRIP: 5.5 [PH] (ref 5–7)
RBC #/AREA URNS AUTO: NORMAL /HPF
SP GR UR STRIP: >=1.03 (ref 1–1.03)
SQUAMOUS #/AREA URNS AUTO: NORMAL /LPF
UROBILINOGEN UR STRIP-ACNC: 1 E.U./DL
WBC #/AREA URNS AUTO: NORMAL /HPF

## 2023-03-08 PROCEDURE — 99213 OFFICE O/P EST LOW 20 MIN: CPT | Performed by: FAMILY MEDICINE

## 2023-03-08 PROCEDURE — 81001 URINALYSIS AUTO W/SCOPE: CPT | Performed by: FAMILY MEDICINE

## 2023-04-20 NOTE — PROGRESS NOTES
Name: Chintan Niño    MRN: [id number]   YOB: 1980                 Chief Complaint:   Urinary frequency         Assessment and Plan:   42 year old male with acute onset of urinary frequency and mild slowing of urinary stream in March 2023, now resolved. UA negative. Occurred in the setting of some back pain that flared up after sleeping on a bad mattress while traveling. Today, he is asymptomatic and feels to be voiding normally. We discussed possible etiologies for his symptoms. Also discussed general bladder health and limiting bladder irritants in the diet.   -He can follow up with urology on an as needed basis if symptoms recur.     Belgica Westbrook PA-C  April 21, 2023          History of Present Illness:   Chintan Niño is a 42 year old male seen today via virtual visit who presents via self-referral for evaluation of urinary frequency. Symptoms started acutely in the first part of March 2023. He noticed an increase in urinary frequency, as well as nocturia 3-4 times per night. His urinary stream seemed somewhat slowed but he felt to be emptying his bladder okay. No dysuria, gross hematuria, fevers, chills, N/V. He was having some mid-back pain around the same time after sleeping on a bad mattress in [place]. No abdominal pain. No personal or family history of prostate issues.    He was evaluated through primary care clinic on 3/8/23 where UA was negative.     TODAY   4/21/23:  Osvaldo reports that his symptoms have since resolved. He is back to urinating normally and getting up 0-1 times per night to void. Back pain also resolved. He has no new concerns today.         Past Medical History:     Past Medical History:   Diagnosis Date     Attention deficit disorder 7/15/2013          GERD (gastroesophageal reflux disease) 1/20/2011     Migraine without aura and without status migrainosus, not intractable 12/15/2019     Obesity 3/8/2023     Other ejaculatory dysfunction 5/12/2020            Past  Surgical History:     Past Surgical History:   Procedure Laterality Date     APPENDECTOMY OPEN CHILD       ENHANCE LASER REFRACTIVE BILATERAL EXISTING PT IN PARAMETERS              Social History:     Social History     Tobacco Use     Smoking status: Former     Smokeless tobacco: Former   Vaping Use     Vaping status: Never Used     Passive vaping exposure: Yes   Substance Use Topics     Alcohol use: Yes            Family History:     Family History   Problem Relation Age of Onset     Diabetes Mother      Retinal detachment Mother      Cancer Father         Bone cancer     Glaucoma Father      Cerebrovascular Disease Maternal Grandmother      Asthma No family hx of      C.A.D. No family hx of      Hypertension No family hx of      Breast Cancer No family hx of      Cancer - colorectal No family hx of      Prostate Cancer No family hx of      Macular Degeneration No family hx of             Allergies:   No Known Allergies         Medications:     Current Outpatient Medications   Medication Sig     galcanezumab-gnlm (EMGALITY) 120 MG/ML injection Inject 1 mL (120 mg) Subcutaneous every 28 days     naproxen (NAPROSYN) 500 MG tablet Take 1 tablet (500 mg) by mouth 2 times daily as needed for moderate pain (4-6)     tiZANidine (ZANAFLEX) 2 MG tablet Take 1 tablet (2 mg) by mouth every evening as needed for muscle spasms     ZOLMitriptan (ZOMIG) 5 MG nasal spray Spray 1 spray in nostril at onset of headache for migraine (repeat in 2 hours if needed) May repeat in 2 hours. Max 2 sprays/24 hours.     No current facility-administered medications for this visit.             Review of Systems:    ROS: 14 point ROS neg other than the symptoms noted above in the HPI and PMH.          Physical Exam:   GENERAL: Healthy, alert and no distress  EYES: Eyes grossly normal to inspection.  No discharge or erythema, or obvious scleral/conjunctival abnormalities.  RESP: No audible wheeze, cough, or visible cyanosis.  No visible  retractions or increased work of breathing.    SKIN: Visible skin clear. No significant rash, abnormal pigmentation or lesions.  NEURO: Cranial nerves grossly intact.  Mentation and speech appropriate for age.  PSYCH: Mentation appears normal, affect normal/bright, judgement and insight intact, normal speech and appearance well-groomed.       Labs:      Color Urine (no units)   Date Value   03/08/2023 Yellow     Appearance Urine (no units)   Date Value   03/08/2023 Clear     Glucose Urine (mg/dL)   Date Value   03/08/2023 Negative     Bilirubin Urine (no units)   Date Value   03/08/2023 Negative     Ketones Urine (mg/dL)   Date Value   03/08/2023 Negative     Specific Gravity Urine (no units)   Date Value   03/08/2023 >=1.030     pH Urine (no units)   Date Value   03/08/2023 5.5     Protein Albumin Urine (mg/dL)   Date Value   03/08/2023 Negative     Urobilinogen Urine (E.U./dL)   Date Value   03/08/2023 1.0     Nitrite Urine (no units)   Date Value   03/08/2023 Negative     Leukocyte Esterase Urine (no units)   Date Value   03/08/2023 Negative       3/8/23 - urine micro: 0-2 RBC, 0-5 WBC    Creatinine   Date Value Ref Range Status   09/21/2022 1.30 (H) 0.66 - 1.25 mg/dL Final   10/28/2020 1.00 0.66 - 1.25 mg/dL Final       Lab Results   Component Value Date    A1C 5.1 11/06/2019           Imaging:    None       20 minutes spent on the date of the encounter doing chart review, review of test results, interpretation of tests, patient visit and documentation

## 2023-04-21 ENCOUNTER — VIRTUAL VISIT (OUTPATIENT)
Dept: UROLOGY | Facility: CLINIC | Age: 43
End: 2023-04-21
Payer: COMMERCIAL

## 2023-04-21 DIAGNOSIS — R35.0 URINARY FREQUENCY: Primary | ICD-10-CM

## 2023-04-21 PROCEDURE — 99202 OFFICE O/P NEW SF 15 MIN: CPT | Mod: VID | Performed by: PHYSICIAN ASSISTANT

## 2023-04-21 NOTE — PROGRESS NOTES
Virtual Visit Details    Type of service:  Video Visit     Video start time: 3:48 PM    Video end time: 3:57 PM    Originating Location (pt. Location): Home    Distant Location (provider location):  Off-site  Platform used for Video Visit: Anselmo

## 2023-04-21 NOTE — NURSING NOTE
Is the patient currently in the state of [place]? YES    Visit mode:VIDEO    If the visit is dropped, the patient can be reconnected by: VIDEO VISIT: Text to cell phone: [phone]    Will anyone else be joining the visit? NO      How would you like to obtain your AVS? MyChart    Are changes needed to the allergy or medication list? NO    Reason for visit: Video Visit (Frequent Urination)

## 2023-04-21 NOTE — PATIENT INSTRUCTIONS
UROLOGY CLINIC VISIT PATIENT INSTRUCTIONS    Reduce caffeine intake.    Follow up if urinary symptoms recur in the future.     If you have any issues, questions or concerns in the meantime, do not hesitate to contact us at [phone] or via Puzl.     It was a pleasure meeting with you today.  Thank you for allowing me and my team the privilege of caring for you today.  YOU are the reason we are here, and I truly hope we provided you with the excellent service you deserve.  Please let us know if there is anything else we can do for you so that we can be sure you are leaving completely satisfied with your care experience.

## 2023-04-23 ENCOUNTER — HEALTH MAINTENANCE LETTER (OUTPATIENT)
Age: 43
End: 2023-04-23

## 2023-06-08 ENCOUNTER — ANCILLARY PROCEDURE (OUTPATIENT)
Dept: GENERAL RADIOLOGY | Facility: CLINIC | Age: 43
End: 2023-06-08
Attending: FAMILY MEDICINE
Payer: COMMERCIAL

## 2023-06-08 ENCOUNTER — OFFICE VISIT (OUTPATIENT)
Dept: ORTHOPEDICS | Facility: CLINIC | Age: 43
End: 2023-06-08
Payer: COMMERCIAL

## 2023-06-08 VITALS
BODY MASS INDEX: 30.92 KG/M2 | HEIGHT: 68 IN | WEIGHT: 204 LBS | DIASTOLIC BLOOD PRESSURE: 72 MMHG | SYSTOLIC BLOOD PRESSURE: 120 MMHG

## 2023-06-08 DIAGNOSIS — M25.512 CHRONIC LEFT SHOULDER PAIN: Primary | ICD-10-CM

## 2023-06-08 DIAGNOSIS — M25.512 LEFT SHOULDER PAIN: ICD-10-CM

## 2023-06-08 DIAGNOSIS — G89.29 CHRONIC LEFT SHOULDER PAIN: Primary | ICD-10-CM

## 2023-06-08 PROCEDURE — 20611 DRAIN/INJ JOINT/BURSA W/US: CPT | Mod: LT | Performed by: FAMILY MEDICINE

## 2023-06-08 PROCEDURE — 73030 X-RAY EXAM OF SHOULDER: CPT | Mod: TC | Performed by: RADIOLOGY

## 2023-06-08 PROCEDURE — 99214 OFFICE O/P EST MOD 30 MIN: CPT | Mod: 25 | Performed by: FAMILY MEDICINE

## 2023-06-08 RX ADMIN — BETAMETHASONE SODIUM PHOSPHATE AND BETAMETHASONE ACETATE 6 MG: 3; 3 INJECTION, SUSPENSION INTRA-ARTICULAR; INTRALESIONAL; INTRAMUSCULAR; SOFT TISSUE at 10:00

## 2023-06-08 RX ADMIN — ROPIVACAINE HYDROCHLORIDE 4 ML: 5 INJECTION, SOLUTION EPIDURAL; INFILTRATION; PERINEURAL at 10:00
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
## 2023-06-08 NOTE — PATIENT INSTRUCTIONS
# Chronic Left Shoulder Pain: Symptoms over the past 3 months without inciting injury.  He does have a history of left cervical radiculopathy but symptoms are flared currently.  On examination he does have tenderness to palpation over the rotator cuff and biceps tendons.  Symptoms affecting his ability to work and sleep.  Reviewed left shoulder x-rays showing no skin arthritis, no calcifications.  Likely cause of pain due to irritated rotator cuff tendons, possible biceps tendon.  Other consideration would be a flare of left cervical radiculopathy.  Given this plan to treat as below and follow-up if not improving over the next 3 to 4 weeks.  Testing Findings: No significant arthritis in the left shoulder  Treatment: Activities tolerated, home exercise given today  Job: As tolerated  Medications:  Limited tylenol/ibuprofen for pain for 1-2 weeks, left subacromial steroid injection  Follow-up: In 3 to 4 weeks if symptoms do not improve with primary care provider, sooner if worsening  Can consider biceps tendon steroid injection if symptoms are still persisting and anterior shoulder    If you have not yet received the influenza vaccine but would like to get one, please call  [phone] or you can schedule via [email or web address]    It was great seeing you today!    Jemal Sampson MD, [facility] Injection Discharge Instructions    Procedure: left subacromial bursa steroid injection     You may shower, however avoid swimming, tub baths or hot tubs for 24 hours following your procedure  You may have a mild to moderate increase in pain for several days following the injection.  It may take up to 14 days for the steroid medication to start working although you may feel the effect as early as a few days after the procedure.  You may use ice packs for 10-15 minutes, 3 to 4 times a day at the injection site for comfort  You may use anti-inflammatory medications (such as Ibuprofen or Aleve or Advil) or Tylenol for pain control  if necessary  If you were fasting, you may resume your normal diet and medications after the procedure  If you have diabetes, check your blood sugar more frequently than usual as your blood sugar may be higher than normal for 10-14 days following a steroid injection. Contact your doctor who manages your diabetes if your blood sugar is higher than usual    If you experience any of the following, call [facility] @ [phone] or [phone]  -Fever over 100 degree F  -Swelling, bleeding, redness, drainage, warmth at the injection site  - New or worsening pain

## 2023-06-12 RX ORDER — ROPIVACAINE HYDROCHLORIDE 5 MG/ML
4 INJECTION, SOLUTION EPIDURAL; INFILTRATION; PERINEURAL
Status: SHIPPED | OUTPATIENT
Start: 2023-06-08

## 2023-06-12 RX ORDER — BETAMETHASONE SODIUM PHOSPHATE AND BETAMETHASONE ACETATE 3; 3 MG/ML; MG/ML
6 INJECTION, SUSPENSION INTRA-ARTICULAR; INTRALESIONAL; INTRAMUSCULAR; SOFT TISSUE
Status: SHIPPED | OUTPATIENT
Start: 2023-06-08

## 2023-10-06 ENCOUNTER — TRANSFERRED RECORDS (OUTPATIENT)
Dept: HEALTH INFORMATION MANAGEMENT | Facility: CLINIC | Age: 43
End: 2023-10-06
Payer: COMMERCIAL

## 2023-10-06 ENCOUNTER — MEDICAL CORRESPONDENCE (OUTPATIENT)
Dept: HEALTH INFORMATION MANAGEMENT | Facility: CLINIC | Age: 43
End: 2023-10-06
Payer: COMMERCIAL

## 2023-10-09 ENCOUNTER — TRANSCRIBE ORDERS (OUTPATIENT)
Dept: OTHER | Age: 43
End: 2023-10-09

## 2023-10-09 DIAGNOSIS — R09.A2 FOREIGN BODY SENSATION IN THROAT: ICD-10-CM

## 2023-10-09 DIAGNOSIS — K21.9 GASTROESOPHAGEAL REFLUX DISEASE WITHOUT ESOPHAGITIS: Primary | ICD-10-CM

## 2023-10-11 ENCOUNTER — MEDICAL CORRESPONDENCE (OUTPATIENT)
Dept: HEALTH INFORMATION MANAGEMENT | Facility: CLINIC | Age: 43
End: 2023-10-11
Payer: COMMERCIAL

## 2023-10-11 NOTE — TELEPHONE ENCOUNTER
REFERRAL INFORMATION:  Referring Provider:  Loc Rodriguez MD   Referring Clinic:  [facility]   Reason for Visit/Diagnosis:    Gastroesophageal reflux disease without esophagitis   Foreign body sensation in throat        FUTURE VISIT INFORMATION:  Appointment Date: 10/12/2023  Appointment Time:      NOTES STATUS DETAILS   OFFICE NOTE from Referring Provider N/A    OFFICE NOTE from Other Specialist Received  ENT Specialist:  10/6/2023 OV with PREETI Rodriguez  4/6/2020 OV with JACK Stark   HOSPITAL DISCHARGE SUMMARY/  ED VISITS N/A    OPERATIVE REPORT N/A    MEDICATION LIST N/A         ENDOSCOPY  N/A    COLONOSCOPY N/A    IMAGING (CT, MRI, EGD, MRCP, Small Bowel Follow Through/SBT, MR/CT Enterography) N/A    Sent Request to ENT Specialist for records. Receive records from ENT , faxed to [facility].

## 2023-10-12 ENCOUNTER — TRANSFERRED RECORDS (OUTPATIENT)
Dept: HEALTH INFORMATION MANAGEMENT | Facility: CLINIC | Age: 43
End: 2023-10-12

## 2023-10-12 ENCOUNTER — PRE VISIT (OUTPATIENT)
Dept: GASTROENTEROLOGY | Facility: CLINIC | Age: 43
End: 2023-10-12

## 2023-11-07 NOTE — PATIENT INSTRUCTIONS
Dear Osvaldo,    Based on your exposure to COVID-19 (coronavirus), we would like to test you for this virus. I have placed an order for this test. The best time for testing is 5-7 days after the exposure.    How to schedule:  Go to your [employer] home page and scroll down to the section that says  You have an appointment that needs to be scheduled  and click the large green button that says  Schedule Now  and follow the steps to find the next available opening.     If you are unable to complete these [employer] scheduling steps, please call [phone] to schedule your testing.     Monoclonal antibody treatment after exposure:  Because you have been exposed to COVID-19, you may be able to receive a treatment with monoclonal antibodies. This treatment can lower your risk of severe illness and going to the hospital. It is given through an IV or under your skin (subcutaneous) and must be given at an infusion center.   To be eligible, you must be 12 years or older, at least 88 pounds and:    Are not fully vaccinated against COVID-19, OR    Are immunocompromised     If you would like to sign up to be considered to receive the monoclonal antibody medicine, please complete a participation form through the [facility] here:  MNRAP ([email or web address]). You may also call the [facility] COVID-19 Public Hotline at 1-200.878.8272 (open Mon-Fri: 9am-7pm and Sat: 10am-6pm).     Not all people who are eligible will receive the medicine since supply is limited. You will be contacted in the next 1 to 2 business days only if you are selected. If you do not receive a call, you have not been selected to receive the medicine. If you have any questions about this medication, please contact your primary care provider. For more information, see [email or web address]/[email or web address]    Return to work/school/ guidance:   Please let your workplace manager and  staffing office know when your quarantine ends. We cannot give you an exact date as it depends on the information below. You can calculate this on your own or work with your manager/staffing office to calculate this.    Quarantine Guidelines:  You are considered exposed if you have been within 6 feet of an infected person(s) for 15 minutes or more over a 24-hour period. Quarantine will start after the LAST time you had contact with the infected person while they were contagious (for example, if you saw someone on Monday and Wednesday, your quarantine would start after Wednesday).     If you have NO symptoms (asymptomatic):    Stay home for 14 days (quarantine) after your LAST contact with a person who has COVID-19 (this remains the CDC recommendation for greatest protection against spread of COVID-19), OR    10-day quarantine with no test, OR    Minimum 7-day quarantine with negative RT-PCR test collected on day 5 or later    Quarantine Guideline exceptions:    People who have been fully vaccinated do not need to quarantine unless they have symptoms. You are considered fully vaccinated 2 weeks after your 2nd dose in a 2-dose series or 2 weeks after a single-dose series. This includes vaccinated health care workers.  o Fully vaccinated people should still get tested 5-7 days after exposure, even if they don t have symptoms.   Note: If you have ongoing exposure to the COVID-positive person, this quarantine period may be more than 14 days. For example, if you continue to be exposed to your child who tested positive and cannot isolate from them, then the quarantine of 7-14 days can't start until your child is no longer contagious. This is typically 10 days from onset of the child's symptoms. So, the total duration may be 17-24 days in this case.   Please contact your doctor if you have questions or call the [facility] Public Hotline: 1-204.590.7351 (Mon-Fri: 9am-7pm and Sat: 10am-6pm).     How to Quarantine:     Monitor your  symptoms until 14 days after your last exposure. If you develop signs or symptoms, isolate and get tested (even if you have been tested already).    Stay home and away from others. Don't go to school or anywhere else. Generally, quarantine means staying home from work but there are some exceptions to this. Please contact your workplace. Cover your mouth and nose with a face covering if you must be around other people.     Wash your hands and face often. Use soap and water.    What are the symptoms of COVID-19?  The most common symptoms are cough, fever and trouble breathing. Less common symptoms include headache, body aches, fatigue (feeling very tired), chills, sore throat, stuffy or runny nose, diarrhea (loose poop), loss of taste or smell, belly pain, and nausea or vomiting (feeling sick to your stomach or throwing up).  If you develop symptoms, follow these guidelines:    If you're normally healthy: Please start another eVisit.    If you have a serious health problem (like cancer, heart failure, an organ transplant or kidney disease): Call your specialty clinic. Let them know that you might have COVID-19.    Where can I get more information?    [facility] - About COVID-19: [email or web address]     CDC - What to Do If You're Sick:     www.cdc.gov/coronavirus/2019-ncov/about/steps-when-sick.html    CDC - Ending Home Isolation:  https://www.cdc.gov/coronavirus/2019-ncov/your-health/quarantine-isolation.html    CDC - Caring for Someone:  www.cdc.gov/coronavirus/2019-ncov/if-you-are-sick/care-for-someone.html    [facility] clinical trials (COVID-19 research studies): clinicalaffairs.[facility]/umn-clinical-trials    Below are the COVID-19 hotlines at the [facility] of Health ([facility]). Interpreters are available.  o For health questions: Call 051-860-2183 or 1-453.187.7917 (7 am to 7 pm)  o For questions about schools and childcare: Call 598-965-3992 or 1-959.240.4009 (7 a.m. to 7  p.m.)     125

## 2023-12-13 ENCOUNTER — VIRTUAL VISIT (OUTPATIENT)
Dept: NEUROLOGY | Facility: CLINIC | Age: 43
End: 2023-12-13
Payer: COMMERCIAL

## 2023-12-13 DIAGNOSIS — G43.709 CHRONIC MIGRAINE WITHOUT AURA WITHOUT STATUS MIGRAINOSUS, NOT INTRACTABLE: ICD-10-CM

## 2023-12-13 PROCEDURE — 99213 OFFICE O/P EST LOW 20 MIN: CPT | Mod: VID | Performed by: PSYCHIATRY & NEUROLOGY

## 2023-12-13 RX ORDER — ZOLMITRIPTAN 5 MG/1
1 SPRAY NASAL
Qty: 9 EACH | Refills: 11 | Status: SHIPPED | OUTPATIENT
Start: 2023-12-13

## 2023-12-13 ASSESSMENT — HEADACHE IMPACT TEST (HIT 6)
HOW OFTEN DO HEADACHES LIMIT YOUR DAILY ACTIVITIES: ALWAYS
HOW OFTEN HAVE YOU FELT FED UP OR IRRITATED BECAUSE OF YOUR HEADACHES: NEVER
WHEN YOU HAVE HEADACHES HOW OFTEN IS THE PAIN SEVERE: ALWAYS
WHEN YOU HAVE A HEADACHE HOW OFTEN DO YOU WISH YOU COULD LIE DOWN: ALWAYS
HIT6 TOTAL SCORE: 59
HOW OFTEN HAVE YOU FELT TOO TIRED TO WORK BECAUSE OF YOUR HEADACHES: RARELY
HOW OFTEN DID HEADACHS LIMIT CONCENTRATION ON WORK OR DAILY ACTIVITY: NEVER

## 2023-12-13 ASSESSMENT — PAIN SCALES - GENERAL: PAINLEVEL: NO PAIN (0)

## 2023-12-13 NOTE — LETTER
12/13/2023       RE: Chintan Niño  [address]  [facility] [facility] [address]       Dear Colleague,    Thank you for referring your patient, Chintan Niño, to the [facility]. Please see a copy of my visit note below.    [facility]    Headache Neurology Progress Note  December 13, 2023    Subjective:    Chintan Niño returns for follow up of migraine.  He previously had a very good response to Emgality subcutaneous injection every 28 days.    Today, he reports that headaches continue to be improved on Emgality, with 1 or 2 headaches a month for the first half of the year or so, and then headache resolved.    He stopped Emgality after headaches resolved.    He reports 3 migraine attacks in the past 3 months.     Excedrin migraine was not effective for these breakthrough attacks.  The zolmitriptan NS is helpful.    Chips are a trigger.  He finds avoiding potato chips to be helpful.    Otherwise, he is starting hockey this winter, and wonders if his headaches will change with this increased activity and frequent head movement.    Objective:    Vitals: There were no vitals taken for this visit.  General: Cooperative, NAD  Neurologic:  Mental Status: Fully alert, attentive and oriented. Speech clear and fluent.   Cranial Nerves: Facial movements symmetric.   Motor: No abnormal movements.      Assessment/Plan:   Chintan Niño is a 43 year old man who presents for follow-up of chronic migraine without aura.  With significant headache improvement, he was able to start preventative therapy this year.     Acute treatment with zolmitriptan nasal spray remains active.     I recommend he continue his current plan with acute treatment.     We discussed that, particular as he starts hockey again, if headaches return to multiple days a week, that we would restart Emgality.   -He will let me know if his headache  frequency changes so that I can send through another prescription for Emgality.   -In the future, if alternative options are needed, other CGRP inhibitors or botulinum toxin injections could be considered.     I will plan to see him back in 1 year, or sooner if needed.           Again, thank you for allowing me to participate in the care of your patient.      Sincerely,    Kelin Ruiz MD

## 2023-12-13 NOTE — PROGRESS NOTES
[facility]    Headache Neurology Progress Note  December 13, 2023    Subjective:    Chintan Niño returns for follow up of migraine.  He previously had a very good response to Emgality subcutaneous injection every 28 days.    Today, he reports that headaches continue to be improved on Emgality, with 1 or 2 headaches a month for the first half of the year or so, and then headache resolved.    He stopped Emgality after headaches resolved.    He reports 3 migraine attacks in the past 3 months.     Excedrin migraine was not effective for these breakthrough attacks.  The zolmitriptan NS is helpful.    Chips are a trigger.  He finds avoiding potato chips to be helpful.    Otherwise, he is starting hockey this winter, and wonders if his headaches will change with this increased activity and frequent head movement.    Objective:    Vitals: There were no vitals taken for this visit.  General: Cooperative, NAD  Neurologic:  Mental Status: Fully alert, attentive and oriented. Speech clear and fluent.   Cranial Nerves: Facial movements symmetric.   Motor: No abnormal movements.      Assessment/Plan:   Chintan Niño is a 43 year old man who presents for follow-up of chronic migraine without aura.  With significant headache improvement, he was able to start preventative therapy this year.     Acute treatment with zolmitriptan nasal spray remains active.     I recommend he continue his current plan with acute treatment.     We discussed that, particular as he starts hockey again, if headaches return to multiple days a week, that we would restart Emgality.   -He will let me know if his headache frequency changes so that I can send through another prescription for Emgality.   -In the future, if alternative options are needed, other CGRP inhibitors or botulinum toxin injections could be considered.     I will plan to see him back in 1 year, or sooner if needed.     Kelin Ruiz MD  Neurology

## 2023-12-13 NOTE — NURSING NOTE
Is the patient currently in the state of [place]? YES    Visit mode:VIDEO    If the visit is dropped, the patient can be reconnected by: VIDEO VISIT: Send to e-mail at: [email or web address]    Will anyone else be joining the visit? NO  (If patient encounters technical issues they should call [phone])    How would you like to obtain your AVS? MyChart    Are changes needed to the allergy or medication list? No, Pt stated no changes to allergies, and Pt stated no med changes    Reason for visit: STEVE HERNANDEZ

## 2024-04-27 ENCOUNTER — HEALTH MAINTENANCE LETTER (OUTPATIENT)
Age: 44
End: 2024-04-27

## 2024-05-14 ENCOUNTER — TELEPHONE (OUTPATIENT)
Dept: NEUROLOGY | Facility: CLINIC | Age: 44
End: 2024-05-14
Payer: COMMERCIAL

## 2024-05-14 NOTE — TELEPHONE ENCOUNTER
Left Voicemail (1st Attempt) and Sent Mychart (1st Attempt) for the patient to call back and schedule the following:    Appointment type: Return Headache  Provider: Joseph  Return date: December 14,2024  Specialty phone number: [phone]  Additional appointment(s) needed:   Additonal Notes:       Suly Fields on 5/14/2024 at 10:33 AM

## 2025-01-09 DIAGNOSIS — G43.709 CHRONIC MIGRAINE WITHOUT AURA WITHOUT STATUS MIGRAINOSUS, NOT INTRACTABLE: ICD-10-CM

## 2025-01-09 RX ORDER — ZOLMITRIPTAN 5 MG/1
1 SPRAY NASAL
Qty: 9 EACH | Refills: 11 | Status: SHIPPED | OUTPATIENT
Start: 2025-01-09

## 2025-01-09 NOTE — TELEPHONE ENCOUNTER
RX Authorization    Medication: ZOLMitriptan (ZOMIG) 5 MG nasal spray     Date last refill ordered: 12/13/2023    Quantity ordered: 9    # refills: 11     Date of last clinic visit with ordering provider: 12/13/2023    Date of next clinic visit with ordering provider: None Scheduled

## 2025-05-11 ENCOUNTER — HEALTH MAINTENANCE LETTER (OUTPATIENT)
Age: 45
End: 2025-05-11